# Patient Record
Sex: FEMALE | Race: WHITE | Employment: UNEMPLOYED | URBAN - NONMETROPOLITAN AREA
[De-identification: names, ages, dates, MRNs, and addresses within clinical notes are randomized per-mention and may not be internally consistent; named-entity substitution may affect disease eponyms.]

---

## 2017-06-28 ENCOUNTER — OFFICE VISIT (OUTPATIENT)
Dept: OTOLARYNGOLOGY | Age: 47
End: 2017-06-28

## 2017-06-28 VITALS
HEART RATE: 76 BPM | TEMPERATURE: 98.2 F | HEIGHT: 63 IN | SYSTOLIC BLOOD PRESSURE: 110 MMHG | DIASTOLIC BLOOD PRESSURE: 74 MMHG | BODY MASS INDEX: 22.73 KG/M2 | WEIGHT: 128.3 LBS | RESPIRATION RATE: 16 BRPM

## 2017-06-28 DIAGNOSIS — H65.193 ACUTE MIDDLE EAR EFFUSION, BILATERAL: Primary | ICD-10-CM

## 2017-06-28 PROCEDURE — 1036F TOBACCO NON-USER: CPT | Performed by: NURSE PRACTITIONER

## 2017-06-28 PROCEDURE — G8420 CALC BMI NORM PARAMETERS: HCPCS | Performed by: NURSE PRACTITIONER

## 2017-06-28 PROCEDURE — G8427 DOCREV CUR MEDS BY ELIG CLIN: HCPCS | Performed by: NURSE PRACTITIONER

## 2017-06-28 PROCEDURE — 99213 OFFICE O/P EST LOW 20 MIN: CPT | Performed by: NURSE PRACTITIONER

## 2017-06-28 RX ORDER — METHYLPREDNISOLONE 4 MG/1
TABLET ORAL
Qty: 1 KIT | Refills: 0 | Status: SHIPPED | OUTPATIENT
Start: 2017-06-28 | End: 2017-07-04

## 2017-06-28 ASSESSMENT — ENCOUNTER SYMPTOMS
SINUS PRESSURE: 0
BLOOD IN STOOL: 0
EYE DISCHARGE: 0
APNEA: 0
EYE REDNESS: 0
ABDOMINAL DISTENTION: 0
CONSTIPATION: 0
CHOKING: 0
PHOTOPHOBIA: 0
ABDOMINAL PAIN: 0
WHEEZING: 0
EYE ITCHING: 0
COUGH: 0
COLOR CHANGE: 0
STRIDOR: 0
TROUBLE SWALLOWING: 0
DIARRHEA: 0
VOICE CHANGE: 0
ANAL BLEEDING: 0
CHEST TIGHTNESS: 0
EYE PAIN: 0
FACIAL SWELLING: 0
RHINORRHEA: 0
SHORTNESS OF BREATH: 0
NAUSEA: 0
BACK PAIN: 0
RECTAL PAIN: 0
SORE THROAT: 0
VOMITING: 0

## 2017-07-05 ENCOUNTER — TELEPHONE (OUTPATIENT)
Dept: OTOLARYNGOLOGY | Age: 47
End: 2017-07-05

## 2017-07-14 ENCOUNTER — OFFICE VISIT (OUTPATIENT)
Dept: OTOLARYNGOLOGY | Age: 47
End: 2017-07-14

## 2017-07-14 VITALS
HEIGHT: 63 IN | RESPIRATION RATE: 16 BRPM | SYSTOLIC BLOOD PRESSURE: 110 MMHG | BODY MASS INDEX: 23.27 KG/M2 | DIASTOLIC BLOOD PRESSURE: 60 MMHG | WEIGHT: 131.3 LBS | HEART RATE: 88 BPM | TEMPERATURE: 98.3 F

## 2017-07-14 DIAGNOSIS — H91.90 PERCEIVED HEARING LOSS: Primary | ICD-10-CM

## 2017-07-14 PROCEDURE — 1036F TOBACCO NON-USER: CPT | Performed by: OTOLARYNGOLOGY

## 2017-07-14 PROCEDURE — G8427 DOCREV CUR MEDS BY ELIG CLIN: HCPCS | Performed by: OTOLARYNGOLOGY

## 2017-07-14 PROCEDURE — G8420 CALC BMI NORM PARAMETERS: HCPCS | Performed by: OTOLARYNGOLOGY

## 2017-07-14 PROCEDURE — 99212 OFFICE O/P EST SF 10 MIN: CPT | Performed by: OTOLARYNGOLOGY

## 2017-07-14 ASSESSMENT — ENCOUNTER SYMPTOMS
SORE THROAT: 0
VOICE CHANGE: 0
EYE DISCHARGE: 0
VOMITING: 0
WHEEZING: 0
BLOOD IN STOOL: 0
DIARRHEA: 0
ABDOMINAL DISTENTION: 0
SINUS PRESSURE: 0
TROUBLE SWALLOWING: 0
BACK PAIN: 1
RHINORRHEA: 0
RECTAL PAIN: 0
ABDOMINAL PAIN: 0
NAUSEA: 0
PHOTOPHOBIA: 0
CHOKING: 0
EYE PAIN: 0
COLOR CHANGE: 0
APNEA: 0
ANAL BLEEDING: 0
CHEST TIGHTNESS: 0
SHORTNESS OF BREATH: 0
CONSTIPATION: 0
COUGH: 0
FACIAL SWELLING: 0
EYE ITCHING: 0
STRIDOR: 0
EYE REDNESS: 0

## 2019-03-13 ENCOUNTER — APPOINTMENT (OUTPATIENT)
Dept: CT IMAGING | Age: 49
End: 2019-03-13
Payer: COMMERCIAL

## 2019-03-13 ENCOUNTER — HOSPITAL ENCOUNTER (EMERGENCY)
Age: 49
Discharge: HOME | End: 2019-03-13
Attending: EMERGENCY MEDICINE
Payer: COMMERCIAL

## 2019-03-13 VITALS
DIASTOLIC BLOOD PRESSURE: 82 MMHG | BODY MASS INDEX: 23.04 KG/M2 | HEART RATE: 99 BPM | TEMPERATURE: 98.3 F | RESPIRATION RATE: 18 BRPM | SYSTOLIC BLOOD PRESSURE: 111 MMHG | WEIGHT: 130 LBS | HEIGHT: 63 IN | OXYGEN SATURATION: 98 %

## 2019-03-13 DIAGNOSIS — N39.0 URINARY TRACT INFECTION WITHOUT HEMATURIA, SITE UNSPECIFIED: Primary | ICD-10-CM

## 2019-03-13 DIAGNOSIS — R55 NEAR SYNCOPE: ICD-10-CM

## 2019-03-13 LAB
ALBUMIN SERPL-MCNC: 4.5 G/DL (ref 3.5–5.1)
ALP BLD-CCNC: 18 U/L (ref 38–126)
ALT SERPL-CCNC: 12 U/L (ref 11–66)
AMPHETAMINE+METHAMPHETAMINE URINE SCREEN: NEGATIVE
ANION GAP SERPL CALCULATED.3IONS-SCNC: 14 MEQ/L (ref 8–16)
AST SERPL-CCNC: 15 U/L (ref 5–40)
BACTERIA: ABNORMAL /HPF
BARBITURATE QUANTITATIVE URINE: NEGATIVE
BASOPHILS # BLD: 0.5 %
BASOPHILS ABSOLUTE: 0.1 THOU/MM3 (ref 0–0.1)
BENZODIAZEPINE QUANTITATIVE URINE: NEGATIVE
BILIRUB SERPL-MCNC: 0.5 MG/DL (ref 0.3–1.2)
BILIRUBIN DIRECT: < 0.2 MG/DL (ref 0–0.3)
BILIRUBIN URINE: NEGATIVE
BLOOD, URINE: ABNORMAL
BUN BLDV-MCNC: 14 MG/DL (ref 7–22)
CALCIUM SERPL-MCNC: 9.2 MG/DL (ref 8.5–10.5)
CANNABINOID QUANTITATIVE URINE: NEGATIVE
CASTS 2: ABNORMAL /LPF
CASTS UA: ABNORMAL /LPF
CHARACTER, URINE: CLEAR
CHLORIDE BLD-SCNC: 100 MEQ/L (ref 98–111)
CO2: 23 MEQ/L (ref 23–33)
COCAINE METABOLITE QUANTITATIVE URINE: NEGATIVE
COLOR: YELLOW
CREAT SERPL-MCNC: 0.6 MG/DL (ref 0.4–1.2)
CRYSTALS, UA: ABNORMAL
EKG ATRIAL RATE: 84 BPM
EKG P AXIS: 67 DEGREES
EKG P-R INTERVAL: 136 MS
EKG Q-T INTERVAL: 356 MS
EKG QRS DURATION: 72 MS
EKG QTC CALCULATION (BAZETT): 420 MS
EKG R AXIS: 75 DEGREES
EKG T AXIS: 64 DEGREES
EKG VENTRICULAR RATE: 84 BPM
EOSINOPHIL # BLD: 2.3 %
EOSINOPHILS ABSOLUTE: 0.2 THOU/MM3 (ref 0–0.4)
EPITHELIAL CELLS, UA: ABNORMAL /HPF
ERYTHROCYTE [DISTWIDTH] IN BLOOD BY AUTOMATED COUNT: 12.9 % (ref 11.5–14.5)
ERYTHROCYTE [DISTWIDTH] IN BLOOD BY AUTOMATED COUNT: 44 FL (ref 35–45)
ETHYL ALCOHOL, SERUM: < 0.01 %
FLU A ANTIGEN: NEGATIVE
FLU B ANTIGEN: NEGATIVE
GFR SERPL CREATININE-BSD FRML MDRD: > 90 ML/MIN/1.73M2
GLUCOSE BLD-MCNC: 98 MG/DL (ref 70–108)
GLUCOSE URINE: NEGATIVE MG/DL
HCT VFR BLD CALC: 42.2 % (ref 37–47)
HEMOGLOBIN: 13.7 GM/DL (ref 12–16)
IMMATURE GRANS (ABS): 0.05 THOU/MM3 (ref 0–0.07)
IMMATURE GRANULOCYTES: 0.5 %
INR BLD: 0.89 (ref 0.85–1.13)
KETONES, URINE: NEGATIVE
LEUKOCYTE ESTERASE, URINE: ABNORMAL
LIPASE: 22 U/L (ref 5.6–51.3)
LYMPHOCYTES # BLD: 14.2 %
LYMPHOCYTES ABSOLUTE: 1.5 THOU/MM3 (ref 1–4.8)
MAGNESIUM: 2 MG/DL (ref 1.6–2.4)
MCH RBC QN AUTO: 30.2 PG (ref 26–33)
MCHC RBC AUTO-ENTMCNC: 32.5 GM/DL (ref 32.2–35.5)
MCV RBC AUTO: 93.2 FL (ref 81–99)
MISCELLANEOUS 2: ABNORMAL
MONOCYTES # BLD: 8.1 %
MONOCYTES ABSOLUTE: 0.9 THOU/MM3 (ref 0.4–1.3)
MUCUS: ABNORMAL
NITRITE, URINE: NEGATIVE
NUCLEATED RED BLOOD CELLS: 0 /100 WBC
OPIATES, URINE: NEGATIVE
OSMOLALITY CALCULATION: 274.3 MOSMOL/KG (ref 275–300)
OXYCODONE: NEGATIVE
PH UA: 5.5 (ref 5–9)
PHENCYCLIDINE QUANTITATIVE URINE: NEGATIVE
PLATELET # BLD: 281 THOU/MM3 (ref 130–400)
PMV BLD AUTO: 9.2 FL (ref 9.4–12.4)
POTASSIUM SERPL-SCNC: 3.9 MEQ/L (ref 3.5–5.2)
PRO-BNP: 65.1 PG/ML (ref 0–900)
PROLACTIN: 37 NG/ML
PROTEIN UA: NEGATIVE
RBC # BLD: 4.53 MILL/MM3 (ref 4.2–5.4)
RBC URINE: ABNORMAL /HPF
REASON FOR REJECTION: NORMAL
REJECTED TEST: NORMAL
RENAL EPITHELIAL, UA: ABNORMAL
SEG NEUTROPHILS: 74.4 %
SEGMENTED NEUTROPHILS ABSOLUTE COUNT: 8 THOU/MM3 (ref 1.8–7.7)
SODIUM BLD-SCNC: 137 MEQ/L (ref 135–145)
SPECIFIC GRAVITY, URINE: 1.02 (ref 1–1.03)
TOTAL PROTEIN: 7.2 G/DL (ref 6.1–8)
TROPONIN T: < 0.01 NG/ML
TSH SERPL DL<=0.05 MIU/L-ACNC: 6.27 UIU/ML (ref 0.4–4.2)
UROBILINOGEN, URINE: 0.2 EU/DL (ref 0–1)
WBC # BLD: 10.7 THOU/MM3 (ref 4.8–10.8)
WBC UA: ABNORMAL /HPF
YEAST: ABNORMAL

## 2019-03-13 PROCEDURE — 99285 EMERGENCY DEPT VISIT HI MDM: CPT

## 2019-03-13 PROCEDURE — 81001 URINALYSIS AUTO W/SCOPE: CPT

## 2019-03-13 PROCEDURE — 2580000003 HC RX 258: Performed by: EMERGENCY MEDICINE

## 2019-03-13 PROCEDURE — 85025 COMPLETE CBC W/AUTO DIFF WBC: CPT

## 2019-03-13 PROCEDURE — 93010 ELECTROCARDIOGRAM REPORT: CPT | Performed by: INTERNAL MEDICINE

## 2019-03-13 PROCEDURE — 82248 BILIRUBIN DIRECT: CPT

## 2019-03-13 PROCEDURE — 83690 ASSAY OF LIPASE: CPT

## 2019-03-13 PROCEDURE — 85610 PROTHROMBIN TIME: CPT

## 2019-03-13 PROCEDURE — 84146 ASSAY OF PROLACTIN: CPT

## 2019-03-13 PROCEDURE — G0480 DRUG TEST DEF 1-7 CLASSES: HCPCS

## 2019-03-13 PROCEDURE — 84443 ASSAY THYROID STIM HORMONE: CPT

## 2019-03-13 PROCEDURE — 87086 URINE CULTURE/COLONY COUNT: CPT

## 2019-03-13 PROCEDURE — 83735 ASSAY OF MAGNESIUM: CPT

## 2019-03-13 PROCEDURE — 80307 DRUG TEST PRSMV CHEM ANLYZR: CPT

## 2019-03-13 PROCEDURE — 93005 ELECTROCARDIOGRAM TRACING: CPT | Performed by: EMERGENCY MEDICINE

## 2019-03-13 PROCEDURE — 87186 SC STD MICRODIL/AGAR DIL: CPT

## 2019-03-13 PROCEDURE — 87184 SC STD DISK METHOD PER PLATE: CPT

## 2019-03-13 PROCEDURE — 87804 INFLUENZA ASSAY W/OPTIC: CPT

## 2019-03-13 PROCEDURE — 87077 CULTURE AEROBIC IDENTIFY: CPT

## 2019-03-13 PROCEDURE — 83880 ASSAY OF NATRIURETIC PEPTIDE: CPT

## 2019-03-13 PROCEDURE — 80053 COMPREHEN METABOLIC PANEL: CPT

## 2019-03-13 PROCEDURE — 84484 ASSAY OF TROPONIN QUANT: CPT

## 2019-03-13 PROCEDURE — 36415 COLL VENOUS BLD VENIPUNCTURE: CPT

## 2019-03-13 RX ORDER — 0.9 % SODIUM CHLORIDE 0.9 %
1000 INTRAVENOUS SOLUTION INTRAVENOUS ONCE
Status: COMPLETED | OUTPATIENT
Start: 2019-03-13 | End: 2019-03-13

## 2019-03-13 RX ORDER — NITROFURANTOIN 25; 75 MG/1; MG/1
100 CAPSULE ORAL 2 TIMES DAILY
Qty: 20 CAPSULE | Refills: 0 | Status: SHIPPED | OUTPATIENT
Start: 2019-03-13 | End: 2019-03-23

## 2019-03-13 RX ORDER — ONDANSETRON 2 MG/ML
4 INJECTION INTRAMUSCULAR; INTRAVENOUS ONCE
Status: DISCONTINUED | OUTPATIENT
Start: 2019-03-13 | End: 2019-03-13 | Stop reason: HOSPADM

## 2019-03-13 RX ADMIN — SODIUM CHLORIDE 1000 ML: 9 INJECTION, SOLUTION INTRAVENOUS at 04:37

## 2019-03-13 ASSESSMENT — ENCOUNTER SYMPTOMS
NAUSEA: 0
ABDOMINAL PAIN: 0
SINUS PRESSURE: 0
CONSTIPATION: 0
BACK PAIN: 0
WHEEZING: 0
COUGH: 0
TROUBLE SWALLOWING: 0
EYE ITCHING: 0
CHEST TIGHTNESS: 0
EYE DISCHARGE: 0
SHORTNESS OF BREATH: 1
BLOOD IN STOOL: 0
EYE REDNESS: 0
CHOKING: 0
SORE THROAT: 0
RHINORRHEA: 0
VOMITING: 0
EYE PAIN: 0
PHOTOPHOBIA: 0
ABDOMINAL DISTENTION: 0
VOICE CHANGE: 0
DIARRHEA: 0

## 2019-03-15 LAB
ORGANISM: ABNORMAL
URINE CULTURE REFLEX: ABNORMAL

## 2019-04-02 ENCOUNTER — NURSE TRIAGE (OUTPATIENT)
Dept: ADMINISTRATIVE | Age: 49
End: 2019-04-02

## 2019-07-20 ENCOUNTER — HOSPITAL ENCOUNTER (OUTPATIENT)
Age: 49
Setting detail: SPECIMEN
Discharge: HOME | End: 2019-07-20
Payer: COMMERCIAL

## 2019-07-20 LAB
-: NORMAL
AMORPHOUS: NORMAL
BACTERIA: NORMAL
BILIRUBIN URINE: NEGATIVE
CASTS UA: NORMAL /LPF (ref 0–8)
COLOR: YELLOW
COMMENT UA: ABNORMAL
CRYSTALS, UA: NORMAL /HPF
EPITHELIAL CELLS UA: NORMAL /HPF (ref 0–5)
GLUCOSE URINE: NEGATIVE
KETONES, URINE: NEGATIVE
LEUKOCYTE ESTERASE, URINE: ABNORMAL
MUCUS: NORMAL
NITRITE, URINE: NEGATIVE
OTHER OBSERVATIONS UA: NORMAL
PH UA: 6.5 (ref 5–8)
PROTEIN UA: NEGATIVE
RBC UA: NORMAL /HPF (ref 0–4)
RENAL EPITHELIAL, UA: NORMAL /HPF
SPECIFIC GRAVITY UA: 1 (ref 1–1.03)
TRICHOMONAS: NORMAL
TURBIDITY: CLEAR
URINE HGB: NEGATIVE
UROBILINOGEN, URINE: NORMAL
WBC UA: NORMAL /HPF (ref 0–5)
YEAST: NORMAL

## 2019-07-21 LAB
CULTURE: NORMAL
Lab: NORMAL
SPECIMEN DESCRIPTION: NORMAL

## 2019-10-16 ENCOUNTER — HOSPITAL ENCOUNTER (EMERGENCY)
Age: 49
Discharge: HOME | End: 2019-10-17
Attending: EMERGENCY MEDICINE
Payer: COMMERCIAL

## 2019-10-16 VITALS
TEMPERATURE: 99.7 F | RESPIRATION RATE: 16 BRPM | SYSTOLIC BLOOD PRESSURE: 118 MMHG | DIASTOLIC BLOOD PRESSURE: 76 MMHG | OXYGEN SATURATION: 98 % | HEART RATE: 105 BPM | HEIGHT: 64 IN | WEIGHT: 130 LBS | BODY MASS INDEX: 22.2 KG/M2

## 2019-10-16 DIAGNOSIS — F41.1 ANXIETY STATE: Primary | ICD-10-CM

## 2019-10-16 DIAGNOSIS — F41.0 PANIC ATTACK: ICD-10-CM

## 2019-10-16 PROCEDURE — 36415 COLL VENOUS BLD VENIPUNCTURE: CPT

## 2019-10-16 PROCEDURE — G0480 DRUG TEST DEF 1-7 CLASSES: HCPCS

## 2019-10-16 PROCEDURE — 84439 ASSAY OF FREE THYROXINE: CPT

## 2019-10-16 PROCEDURE — 84484 ASSAY OF TROPONIN QUANT: CPT

## 2019-10-16 PROCEDURE — 99284 EMERGENCY DEPT VISIT MOD MDM: CPT

## 2019-10-16 PROCEDURE — 84443 ASSAY THYROID STIM HORMONE: CPT

## 2019-10-16 PROCEDURE — 80053 COMPREHEN METABOLIC PANEL: CPT

## 2019-10-16 PROCEDURE — 85025 COMPLETE CBC W/AUTO DIFF WBC: CPT

## 2019-10-16 ASSESSMENT — SLEEP AND FATIGUE QUESTIONNAIRES
DIFFICULTY ARISING: NO
RESTFUL SLEEP: NO
AVERAGE NUMBER OF SLEEP HOURS: 0
DIFFICULTY STAYING ASLEEP: YES
DO YOU USE A SLEEP AID: NO
DIFFICULTY FALLING ASLEEP: YES
DO YOU HAVE DIFFICULTY SLEEPING: YES
SLEEP PATTERN: INSOMNIA

## 2019-10-17 LAB
ACETAMINOPHEN LEVEL: < 5 UG/ML (ref 0–20)
ALBUMIN SERPL-MCNC: 4.8 G/DL (ref 3.5–5.1)
ALP BLD-CCNC: 23 U/L (ref 38–126)
ALT SERPL-CCNC: 9 U/L (ref 11–66)
AMPHETAMINE+METHAMPHETAMINE URINE SCREEN: NEGATIVE
ANION GAP SERPL CALCULATED.3IONS-SCNC: 19 MEQ/L (ref 8–16)
AST SERPL-CCNC: 13 U/L (ref 5–40)
BARBITURATE QUANTITATIVE URINE: NEGATIVE
BASOPHILS # BLD: 0.6 %
BASOPHILS ABSOLUTE: 0.1 THOU/MM3 (ref 0–0.1)
BENZODIAZEPINE QUANTITATIVE URINE: NEGATIVE
BILIRUB SERPL-MCNC: 0.4 MG/DL (ref 0.3–1.2)
BUN BLDV-MCNC: 13 MG/DL (ref 7–22)
CALCIUM SERPL-MCNC: 10.1 MG/DL (ref 8.5–10.5)
CANNABINOID QUANTITATIVE URINE: NEGATIVE
CHLORIDE BLD-SCNC: 101 MEQ/L (ref 98–111)
CO2: 18 MEQ/L (ref 23–33)
COCAINE METABOLITE QUANTITATIVE URINE: NEGATIVE
CREAT SERPL-MCNC: 0.6 MG/DL (ref 0.4–1.2)
EOSINOPHIL # BLD: 0.7 %
EOSINOPHILS ABSOLUTE: 0.1 THOU/MM3 (ref 0–0.4)
ERYTHROCYTE [DISTWIDTH] IN BLOOD BY AUTOMATED COUNT: 13.2 % (ref 11.5–14.5)
ERYTHROCYTE [DISTWIDTH] IN BLOOD BY AUTOMATED COUNT: 41.2 FL (ref 35–45)
ETHYL ALCOHOL, SERUM: < 0.01 %
GFR SERPL CREATININE-BSD FRML MDRD: > 90 ML/MIN/1.73M2
GLUCOSE BLD-MCNC: 122 MG/DL (ref 70–108)
HCT VFR BLD CALC: 39.6 % (ref 37–47)
HEMOGLOBIN: 13.4 GM/DL (ref 12–16)
IMMATURE GRANS (ABS): 0.03 THOU/MM3 (ref 0–0.07)
IMMATURE GRANULOCYTES: 0.3 %
LYMPHOCYTES # BLD: 22.9 %
LYMPHOCYTES ABSOLUTE: 2.6 THOU/MM3 (ref 1–4.8)
MCH RBC QN AUTO: 29.6 PG (ref 26–33)
MCHC RBC AUTO-ENTMCNC: 33.8 GM/DL (ref 32.2–35.5)
MCV RBC AUTO: 87.4 FL (ref 81–99)
MONOCYTES # BLD: 8.8 %
MONOCYTES ABSOLUTE: 1 THOU/MM3 (ref 0.4–1.3)
NUCLEATED RED BLOOD CELLS: 0 /100 WBC
OPIATES, URINE: NEGATIVE
OSMOLALITY CALCULATION: 277.1 MOSMOL/KG (ref 275–300)
OXYCODONE: NEGATIVE
PHENCYCLIDINE QUANTITATIVE URINE: NEGATIVE
PLATELET # BLD: 358 THOU/MM3 (ref 130–400)
PMV BLD AUTO: 8.9 FL (ref 9.4–12.4)
POTASSIUM SERPL-SCNC: 3.4 MEQ/L (ref 3.5–5.2)
RBC # BLD: 4.53 MILL/MM3 (ref 4.2–5.4)
SALICYLATE, SERUM: < 0.3 MG/DL (ref 2–10)
SEG NEUTROPHILS: 66.7 %
SEGMENTED NEUTROPHILS ABSOLUTE COUNT: 7.5 THOU/MM3 (ref 1.8–7.7)
SODIUM BLD-SCNC: 138 MEQ/L (ref 135–145)
T4 FREE: 1.67 NG/DL (ref 0.93–1.76)
TOTAL PROTEIN: 7.9 G/DL (ref 6.1–8)
TROPONIN T: < 0.01 NG/ML
TSH SERPL DL<=0.05 MIU/L-ACNC: 4.63 UIU/ML (ref 0.4–4.2)
WBC # BLD: 11.3 THOU/MM3 (ref 4.8–10.8)

## 2019-10-17 PROCEDURE — 6370000000 HC RX 637 (ALT 250 FOR IP): Performed by: EMERGENCY MEDICINE

## 2019-10-17 PROCEDURE — 80307 DRUG TEST PRSMV CHEM ANLYZR: CPT

## 2019-10-17 RX ORDER — HYDROXYZINE HYDROCHLORIDE 25 MG/1
50 TABLET, FILM COATED ORAL 3 TIMES DAILY PRN
Qty: 30 TABLET | Refills: 0 | Status: ON HOLD | OUTPATIENT
Start: 2019-10-17 | End: 2019-10-28 | Stop reason: HOSPADM

## 2019-10-17 RX ORDER — LORAZEPAM 1 MG/1
1 TABLET ORAL ONCE
Status: COMPLETED | OUTPATIENT
Start: 2019-10-17 | End: 2019-10-17

## 2019-10-17 RX ADMIN — LORAZEPAM 1 MG: 1 TABLET ORAL at 00:35

## 2019-10-17 ASSESSMENT — ENCOUNTER SYMPTOMS
ABDOMINAL PAIN: 0
ABDOMINAL DISTENTION: 0
COUGH: 0
RHINORRHEA: 0
DIARRHEA: 0
NAUSEA: 0
EYE ITCHING: 0
EYE DISCHARGE: 0
SHORTNESS OF BREATH: 0
VOMITING: 0
WHEEZING: 0

## 2019-10-22 ENCOUNTER — HOSPITAL ENCOUNTER (INPATIENT)
Age: 49
LOS: 6 days | Discharge: HOME | DRG: 885 | End: 2019-10-28
Attending: PSYCHIATRY & NEUROLOGY | Admitting: PSYCHIATRY & NEUROLOGY
Payer: COMMERCIAL

## 2019-10-22 DIAGNOSIS — E87.6 HYPOKALEMIA: ICD-10-CM

## 2019-10-22 DIAGNOSIS — F31.9 BIPOLAR 1 DISORDER (HCC): Primary | ICD-10-CM

## 2019-10-22 PROBLEM — F20.9 SCHIZOPHRENIA (HCC): Status: ACTIVE | Noted: 2019-10-22

## 2019-10-22 LAB
ACETAMINOPHEN LEVEL: < 5 UG/ML (ref 0–20)
ALBUMIN SERPL-MCNC: 4.2 G/DL (ref 3.5–5.1)
ALP BLD-CCNC: 18 U/L (ref 38–126)
ALT SERPL-CCNC: 13 U/L (ref 11–66)
ANION GAP SERPL CALCULATED.3IONS-SCNC: 13 MEQ/L (ref 8–16)
AST SERPL-CCNC: 14 U/L (ref 5–40)
BASOPHILS # BLD: 0.7 %
BASOPHILS ABSOLUTE: 0 THOU/MM3 (ref 0–0.1)
BILIRUB SERPL-MCNC: 0.4 MG/DL (ref 0.3–1.2)
BUN BLDV-MCNC: 7 MG/DL (ref 7–22)
CALCIUM SERPL-MCNC: 9.2 MG/DL (ref 8.5–10.5)
CHLORIDE BLD-SCNC: 103 MEQ/L (ref 98–111)
CO2: 27 MEQ/L (ref 23–33)
CREAT SERPL-MCNC: 0.5 MG/DL (ref 0.4–1.2)
EOSINOPHIL # BLD: 1.7 %
EOSINOPHILS ABSOLUTE: 0.1 THOU/MM3 (ref 0–0.4)
ERYTHROCYTE [DISTWIDTH] IN BLOOD BY AUTOMATED COUNT: 13.3 % (ref 11.5–14.5)
ERYTHROCYTE [DISTWIDTH] IN BLOOD BY AUTOMATED COUNT: 44.6 FL (ref 35–45)
ETHYL ALCOHOL, SERUM: < 0.01 %
GFR SERPL CREATININE-BSD FRML MDRD: > 90 ML/MIN/1.73M2
GLUCOSE BLD-MCNC: 90 MG/DL (ref 70–108)
HCT VFR BLD CALC: 36.4 % (ref 37–47)
HEMOGLOBIN: 11.7 GM/DL (ref 12–16)
IMMATURE GRANS (ABS): 0.01 THOU/MM3 (ref 0–0.07)
IMMATURE GRANULOCYTES: 0.2 %
LYMPHOCYTES # BLD: 25.2 %
LYMPHOCYTES ABSOLUTE: 1.4 THOU/MM3 (ref 1–4.8)
MCH RBC QN AUTO: 29.3 PG (ref 26–33)
MCHC RBC AUTO-ENTMCNC: 32.1 GM/DL (ref 32.2–35.5)
MCV RBC AUTO: 91 FL (ref 81–99)
MONOCYTES # BLD: 10.1 %
MONOCYTES ABSOLUTE: 0.6 THOU/MM3 (ref 0.4–1.3)
NUCLEATED RED BLOOD CELLS: 0 /100 WBC
OSMOLALITY CALCULATION: 282.5 MOSMOL/KG (ref 275–300)
PLATELET # BLD: 304 THOU/MM3 (ref 130–400)
PMV BLD AUTO: 8.7 FL (ref 9.4–12.4)
POTASSIUM SERPL-SCNC: 2.8 MEQ/L (ref 3.5–5.2)
PREGNANCY, SERUM: NEGATIVE
RBC # BLD: 4 MILL/MM3 (ref 4.2–5.4)
SALICYLATE, SERUM: < 0.3 MG/DL (ref 2–10)
SEG NEUTROPHILS: 62.1 %
SEGMENTED NEUTROPHILS ABSOLUTE COUNT: 3.5 THOU/MM3 (ref 1.8–7.7)
SODIUM BLD-SCNC: 143 MEQ/L (ref 135–145)
TOTAL PROTEIN: 6.4 G/DL (ref 6.1–8)
TSH SERPL DL<=0.05 MIU/L-ACNC: 2.4 UIU/ML (ref 0.4–4.2)
WBC # BLD: 5.7 THOU/MM3 (ref 4.8–10.8)

## 2019-10-22 PROCEDURE — 6370000000 HC RX 637 (ALT 250 FOR IP): Performed by: PHYSICIAN ASSISTANT

## 2019-10-22 PROCEDURE — 96372 THER/PROPH/DIAG INJ SC/IM: CPT

## 2019-10-22 PROCEDURE — 2580000003 HC RX 258: Performed by: PHYSICIAN ASSISTANT

## 2019-10-22 PROCEDURE — 99285 EMERGENCY DEPT VISIT HI MDM: CPT

## 2019-10-22 PROCEDURE — 85025 COMPLETE CBC W/AUTO DIFF WBC: CPT

## 2019-10-22 PROCEDURE — 80053 COMPREHEN METABOLIC PANEL: CPT

## 2019-10-22 PROCEDURE — 6360000002 HC RX W HCPCS: Performed by: PHYSICIAN ASSISTANT

## 2019-10-22 PROCEDURE — G0480 DRUG TEST DEF 1-7 CLASSES: HCPCS

## 2019-10-22 PROCEDURE — 84443 ASSAY THYROID STIM HORMONE: CPT

## 2019-10-22 PROCEDURE — 96374 THER/PROPH/DIAG INJ IV PUSH: CPT

## 2019-10-22 PROCEDURE — 36415 COLL VENOUS BLD VENIPUNCTURE: CPT

## 2019-10-22 PROCEDURE — 84703 CHORIONIC GONADOTROPIN ASSAY: CPT

## 2019-10-22 PROCEDURE — 1240000000 HC EMOTIONAL WELLNESS R&B

## 2019-10-22 RX ORDER — LORAZEPAM 2 MG/ML
2 INJECTION INTRAMUSCULAR ONCE
Status: COMPLETED | OUTPATIENT
Start: 2019-10-22 | End: 2019-10-22

## 2019-10-22 RX ORDER — BENZTROPINE MESYLATE 0.5 MG/1
0.5 TABLET ORAL 2 TIMES DAILY PRN
Status: ON HOLD | COMMUNITY
End: 2019-10-28 | Stop reason: HOSPADM

## 2019-10-22 RX ORDER — ZIPRASIDONE MESYLATE 20 MG/ML
20 INJECTION, POWDER, LYOPHILIZED, FOR SOLUTION INTRAMUSCULAR ONCE
Status: COMPLETED | OUTPATIENT
Start: 2019-10-22 | End: 2019-10-22

## 2019-10-22 RX ORDER — HYDROXYZINE HYDROCHLORIDE 25 MG/1
50 TABLET, FILM COATED ORAL 3 TIMES DAILY PRN
Status: DISCONTINUED | OUTPATIENT
Start: 2019-10-22 | End: 2019-10-28 | Stop reason: HOSPADM

## 2019-10-22 RX ORDER — ACETAMINOPHEN 325 MG/1
650 TABLET ORAL EVERY 4 HOURS PRN
Status: DISCONTINUED | OUTPATIENT
Start: 2019-10-22 | End: 2019-10-28 | Stop reason: HOSPADM

## 2019-10-22 RX ORDER — MAGNESIUM HYDROXIDE/ALUMINUM HYDROXICE/SIMETHICONE 120; 1200; 1200 MG/30ML; MG/30ML; MG/30ML
30 SUSPENSION ORAL EVERY 6 HOURS PRN
Status: DISCONTINUED | OUTPATIENT
Start: 2019-10-22 | End: 2019-10-28 | Stop reason: HOSPADM

## 2019-10-22 RX ORDER — ZIPRASIDONE MESYLATE 20 MG/ML
10 INJECTION, POWDER, LYOPHILIZED, FOR SOLUTION INTRAMUSCULAR 3 TIMES DAILY PRN
Status: DISCONTINUED | OUTPATIENT
Start: 2019-10-22 | End: 2019-10-28 | Stop reason: HOSPADM

## 2019-10-22 RX ORDER — POTASSIUM CHLORIDE 750 MG/1
40 TABLET, FILM COATED, EXTENDED RELEASE ORAL ONCE
Status: COMPLETED | OUTPATIENT
Start: 2019-10-22 | End: 2019-10-22

## 2019-10-22 RX ORDER — ARIPIPRAZOLE 10 MG/1
10 TABLET ORAL DAILY
Status: ON HOLD | COMMUNITY
End: 2019-10-28 | Stop reason: HOSPADM

## 2019-10-22 RX ORDER — DIPHENHYDRAMINE HYDROCHLORIDE 50 MG/ML
25 INJECTION INTRAMUSCULAR; INTRAVENOUS ONCE
Status: COMPLETED | OUTPATIENT
Start: 2019-10-22 | End: 2019-10-22

## 2019-10-22 RX ORDER — HALOPERIDOL 5 MG
2.5 TABLET ORAL 2 TIMES DAILY PRN
Status: ON HOLD | COMMUNITY
End: 2019-10-28 | Stop reason: HOSPADM

## 2019-10-22 RX ORDER — IBUPROFEN 400 MG/1
400 TABLET ORAL EVERY 6 HOURS PRN
Status: DISCONTINUED | OUTPATIENT
Start: 2019-10-22 | End: 2019-10-28 | Stop reason: HOSPADM

## 2019-10-22 RX ORDER — POTASSIUM CHLORIDE 750 MG/1
40 TABLET, FILM COATED, EXTENDED RELEASE ORAL
Status: DISCONTINUED | OUTPATIENT
Start: 2019-10-23 | End: 2019-10-28 | Stop reason: HOSPADM

## 2019-10-22 RX ADMIN — LORAZEPAM 2 MG: 2 INJECTION INTRAMUSCULAR; INTRAVENOUS at 09:59

## 2019-10-22 RX ADMIN — DIPHENHYDRAMINE HYDROCHLORIDE 25 MG: 50 INJECTION, SOLUTION INTRAMUSCULAR; INTRAVENOUS at 11:41

## 2019-10-22 RX ADMIN — ZIPRASIDONE MESYLATE 20 MG: 20 INJECTION, POWDER, LYOPHILIZED, FOR SOLUTION INTRAMUSCULAR at 11:40

## 2019-10-22 RX ADMIN — POTASSIUM CHLORIDE 40 MEQ: 750 TABLET, FILM COATED, EXTENDED RELEASE ORAL at 14:29

## 2019-10-22 RX ADMIN — WATER 1.2 ML: 1 INJECTION INTRAMUSCULAR; INTRAVENOUS; SUBCUTANEOUS at 11:41

## 2019-10-22 ASSESSMENT — LIFESTYLE VARIABLES: HISTORY_ALCOHOL_USE: NO

## 2019-10-22 ASSESSMENT — PAIN SCALES - GENERAL
PAINLEVEL_OUTOF10: 0
PAINLEVEL_OUTOF10: 0

## 2019-10-22 ASSESSMENT — SLEEP AND FATIGUE QUESTIONNAIRES
DO YOU USE A SLEEP AID: NO
AVERAGE NUMBER OF SLEEP HOURS: 2
DO YOU HAVE DIFFICULTY SLEEPING: YES
DIFFICULTY FALLING ASLEEP: YES
DIFFICULTY STAYING ASLEEP: YES
DIFFICULTY ARISING: YES
SLEEP PATTERN: DIFFICULTY FALLING ASLEEP;DISTURBED/INTERRUPTED SLEEP;EARLY AWAKENING;INSOMNIA;RESTLESSNESS
RESTFUL SLEEP: NO

## 2019-10-23 PROCEDURE — 90792 PSYCH DIAG EVAL W/MED SRVCS: CPT | Performed by: PSYCHIATRY & NEUROLOGY

## 2019-10-23 PROCEDURE — 6370000000 HC RX 637 (ALT 250 FOR IP): Performed by: PHYSICIAN ASSISTANT

## 2019-10-23 PROCEDURE — 6370000000 HC RX 637 (ALT 250 FOR IP): Performed by: PSYCHIATRY & NEUROLOGY

## 2019-10-23 PROCEDURE — 1240000000 HC EMOTIONAL WELLNESS R&B

## 2019-10-23 PROCEDURE — APPSS60 APP SPLIT SHARED TIME 46-60 MINUTES: Performed by: PHYSICIAN ASSISTANT

## 2019-10-23 RX ORDER — QUETIAPINE FUMARATE 25 MG/1
50 TABLET, FILM COATED ORAL 2 TIMES DAILY
Status: DISCONTINUED | OUTPATIENT
Start: 2019-10-23 | End: 2019-10-25

## 2019-10-23 RX ORDER — GLATIRAMER 40 MG/ML
40 INJECTION, SOLUTION SUBCUTANEOUS
Status: DISCONTINUED | OUTPATIENT
Start: 2019-10-23 | End: 2019-10-28 | Stop reason: HOSPADM

## 2019-10-23 RX ADMIN — QUETIAPINE FUMARATE 50 MG: 25 TABLET ORAL at 11:58

## 2019-10-23 RX ADMIN — GLATIRAMER 40 MG: 40 INJECTION, SOLUTION SUBCUTANEOUS at 21:32

## 2019-10-23 RX ADMIN — QUETIAPINE FUMARATE 50 MG: 25 TABLET ORAL at 21:30

## 2019-10-23 RX ADMIN — POTASSIUM CHLORIDE 40 MEQ: 750 TABLET, EXTENDED RELEASE ORAL at 11:58

## 2019-10-23 ASSESSMENT — PAIN DESCRIPTION - ORIENTATION: ORIENTATION: OTHER (COMMENT)

## 2019-10-23 ASSESSMENT — PAIN DESCRIPTION - PROGRESSION: CLINICAL_PROGRESSION: NOT CHANGED

## 2019-10-23 ASSESSMENT — PAIN DESCRIPTION - DESCRIPTORS: DESCRIPTORS: ACHING;DISCOMFORT;TENDER

## 2019-10-23 ASSESSMENT — PAIN SCALES - GENERAL
PAINLEVEL_OUTOF10: 3
PAINLEVEL_OUTOF10: 10

## 2019-10-23 ASSESSMENT — PAIN DESCRIPTION - ONSET: ONSET: ON-GOING

## 2019-10-23 ASSESSMENT — SLEEP AND FATIGUE QUESTIONNAIRES
RESTFUL SLEEP: NO
DO YOU USE A SLEEP AID: NO
DIFFICULTY FALLING ASLEEP: YES
DIFFICULTY STAYING ASLEEP: YES
DO YOU HAVE DIFFICULTY SLEEPING: YES
DIFFICULTY ARISING: YES
SLEEP PATTERN: DIFFICULTY FALLING ASLEEP;DIFFICULTY ARISING;DISTURBED/INTERRUPTED SLEEP;RESTLESSNESS;INSOMNIA

## 2019-10-23 ASSESSMENT — PAIN - FUNCTIONAL ASSESSMENT: PAIN_FUNCTIONAL_ASSESSMENT: PREVENTS OR INTERFERES SOME ACTIVE ACTIVITIES AND ADLS

## 2019-10-23 ASSESSMENT — PAIN DESCRIPTION - LOCATION: LOCATION: GENERALIZED

## 2019-10-23 ASSESSMENT — PAIN DESCRIPTION - FREQUENCY: FREQUENCY: CONTINUOUS

## 2019-10-24 LAB
ANION GAP SERPL CALCULATED.3IONS-SCNC: 14 MEQ/L (ref 8–16)
BUN BLDV-MCNC: 8 MG/DL (ref 7–22)
CALCIUM SERPL-MCNC: 9.6 MG/DL (ref 8.5–10.5)
CHLORIDE BLD-SCNC: 98 MEQ/L (ref 98–111)
CO2: 27 MEQ/L (ref 23–33)
CREAT SERPL-MCNC: 0.6 MG/DL (ref 0.4–1.2)
GFR SERPL CREATININE-BSD FRML MDRD: > 90 ML/MIN/1.73M2
GLUCOSE BLD-MCNC: 116 MG/DL (ref 70–108)
POTASSIUM SERPL-SCNC: 4 MEQ/L (ref 3.5–5.2)
SODIUM BLD-SCNC: 139 MEQ/L (ref 135–145)

## 2019-10-24 PROCEDURE — APPSS30 APP SPLIT SHARED TIME 16-30 MINUTES: Performed by: PHYSICIAN ASSISTANT

## 2019-10-24 PROCEDURE — 99232 SBSQ HOSP IP/OBS MODERATE 35: CPT | Performed by: PSYCHIATRY & NEUROLOGY

## 2019-10-24 PROCEDURE — 36415 COLL VENOUS BLD VENIPUNCTURE: CPT

## 2019-10-24 PROCEDURE — 1240000000 HC EMOTIONAL WELLNESS R&B

## 2019-10-24 PROCEDURE — 6370000000 HC RX 637 (ALT 250 FOR IP): Performed by: PHYSICIAN ASSISTANT

## 2019-10-24 PROCEDURE — 6370000000 HC RX 637 (ALT 250 FOR IP): Performed by: PSYCHIATRY & NEUROLOGY

## 2019-10-24 PROCEDURE — 80048 BASIC METABOLIC PNL TOTAL CA: CPT

## 2019-10-24 PROCEDURE — 90833 PSYTX W PT W E/M 30 MIN: CPT | Performed by: PSYCHIATRY & NEUROLOGY

## 2019-10-24 RX ADMIN — QUETIAPINE FUMARATE 50 MG: 25 TABLET ORAL at 20:02

## 2019-10-24 RX ADMIN — POTASSIUM CHLORIDE 40 MEQ: 750 TABLET, EXTENDED RELEASE ORAL at 08:16

## 2019-10-24 RX ADMIN — QUETIAPINE FUMARATE 50 MG: 25 TABLET ORAL at 08:16

## 2019-10-24 ASSESSMENT — PAIN SCALES - GENERAL
PAINLEVEL_OUTOF10: 6
PAINLEVEL_OUTOF10: 0

## 2019-10-24 ASSESSMENT — PAIN DESCRIPTION - FREQUENCY: FREQUENCY: CONTINUOUS

## 2019-10-24 ASSESSMENT — PAIN DESCRIPTION - PROGRESSION: CLINICAL_PROGRESSION: NOT CHANGED

## 2019-10-24 ASSESSMENT — PAIN DESCRIPTION - DESCRIPTORS: DESCRIPTORS: ACHING;DISCOMFORT

## 2019-10-24 ASSESSMENT — PAIN DESCRIPTION - ONSET: ONSET: ON-GOING

## 2019-10-24 ASSESSMENT — PAIN DESCRIPTION - LOCATION: LOCATION: GENERALIZED

## 2019-10-24 ASSESSMENT — PAIN DESCRIPTION - ORIENTATION: ORIENTATION: OTHER (COMMENT)

## 2019-10-24 ASSESSMENT — PAIN - FUNCTIONAL ASSESSMENT: PAIN_FUNCTIONAL_ASSESSMENT: PREVENTS OR INTERFERES SOME ACTIVE ACTIVITIES AND ADLS

## 2019-10-25 LAB — POTASSIUM SERPL-SCNC: 4.7 MEQ/L (ref 3.5–5.2)

## 2019-10-25 PROCEDURE — 99232 SBSQ HOSP IP/OBS MODERATE 35: CPT | Performed by: PSYCHIATRY & NEUROLOGY

## 2019-10-25 PROCEDURE — 6370000000 HC RX 637 (ALT 250 FOR IP): Performed by: PSYCHIATRY & NEUROLOGY

## 2019-10-25 PROCEDURE — 1240000000 HC EMOTIONAL WELLNESS R&B

## 2019-10-25 PROCEDURE — 36415 COLL VENOUS BLD VENIPUNCTURE: CPT

## 2019-10-25 PROCEDURE — 84132 ASSAY OF SERUM POTASSIUM: CPT

## 2019-10-25 PROCEDURE — APPSS30 APP SPLIT SHARED TIME 16-30 MINUTES: Performed by: PHYSICIAN ASSISTANT

## 2019-10-25 PROCEDURE — 90833 PSYTX W PT W E/M 30 MIN: CPT | Performed by: PSYCHIATRY & NEUROLOGY

## 2019-10-25 RX ORDER — QUETIAPINE FUMARATE 25 MG/1
75 TABLET, FILM COATED ORAL NIGHTLY
Status: DISCONTINUED | OUTPATIENT
Start: 2019-10-25 | End: 2019-10-26

## 2019-10-25 RX ORDER — QUETIAPINE FUMARATE 25 MG/1
50 TABLET, FILM COATED ORAL DAILY
Status: DISCONTINUED | OUTPATIENT
Start: 2019-10-26 | End: 2019-10-28

## 2019-10-25 RX ADMIN — GLATIRAMER 40 MG: 40 INJECTION, SOLUTION SUBCUTANEOUS at 20:32

## 2019-10-25 RX ADMIN — QUETIAPINE FUMARATE 50 MG: 25 TABLET ORAL at 08:18

## 2019-10-25 RX ADMIN — QUETIAPINE FUMARATE 75 MG: 25 TABLET ORAL at 20:32

## 2019-10-25 ASSESSMENT — PAIN SCALES - GENERAL: PAINLEVEL_OUTOF10: 0

## 2019-10-26 LAB — POTASSIUM SERPL-SCNC: 4.6 MEQ/L (ref 3.5–5.2)

## 2019-10-26 PROCEDURE — 90833 PSYTX W PT W E/M 30 MIN: CPT | Performed by: PSYCHIATRY & NEUROLOGY

## 2019-10-26 PROCEDURE — 36415 COLL VENOUS BLD VENIPUNCTURE: CPT

## 2019-10-26 PROCEDURE — 1240000000 HC EMOTIONAL WELLNESS R&B

## 2019-10-26 PROCEDURE — 99232 SBSQ HOSP IP/OBS MODERATE 35: CPT | Performed by: PSYCHIATRY & NEUROLOGY

## 2019-10-26 PROCEDURE — 6370000000 HC RX 637 (ALT 250 FOR IP): Performed by: PSYCHIATRY & NEUROLOGY

## 2019-10-26 PROCEDURE — 84132 ASSAY OF SERUM POTASSIUM: CPT

## 2019-10-26 RX ORDER — QUETIAPINE FUMARATE 25 MG/1
50 TABLET, FILM COATED ORAL NIGHTLY
Status: DISCONTINUED | OUTPATIENT
Start: 2019-10-26 | End: 2019-10-28

## 2019-10-26 RX ADMIN — QUETIAPINE FUMARATE 50 MG: 25 TABLET ORAL at 20:23

## 2019-10-26 RX ADMIN — QUETIAPINE FUMARATE 50 MG: 25 TABLET ORAL at 07:58

## 2019-10-26 ASSESSMENT — PAIN - FUNCTIONAL ASSESSMENT: PAIN_FUNCTIONAL_ASSESSMENT: ACTIVITIES ARE NOT PREVENTED

## 2019-10-26 ASSESSMENT — PAIN DESCRIPTION - FREQUENCY: FREQUENCY: CONTINUOUS

## 2019-10-26 ASSESSMENT — PAIN DESCRIPTION - PROGRESSION: CLINICAL_PROGRESSION: NOT CHANGED

## 2019-10-26 ASSESSMENT — PAIN SCALES - GENERAL: PAINLEVEL_OUTOF10: 0

## 2019-10-27 VITALS
HEIGHT: 63 IN | HEART RATE: 102 BPM | BODY MASS INDEX: 21.26 KG/M2 | DIASTOLIC BLOOD PRESSURE: 63 MMHG | WEIGHT: 120 LBS | SYSTOLIC BLOOD PRESSURE: 116 MMHG | OXYGEN SATURATION: 97 % | TEMPERATURE: 98.8 F | RESPIRATION RATE: 16 BRPM

## 2019-10-27 PROCEDURE — 90833 PSYTX W PT W E/M 30 MIN: CPT | Performed by: PSYCHIATRY & NEUROLOGY

## 2019-10-27 PROCEDURE — 1240000000 HC EMOTIONAL WELLNESS R&B

## 2019-10-27 PROCEDURE — 99232 SBSQ HOSP IP/OBS MODERATE 35: CPT | Performed by: PSYCHIATRY & NEUROLOGY

## 2019-10-27 PROCEDURE — 6370000000 HC RX 637 (ALT 250 FOR IP): Performed by: PSYCHIATRY & NEUROLOGY

## 2019-10-27 RX ADMIN — QUETIAPINE FUMARATE 50 MG: 25 TABLET ORAL at 07:23

## 2019-10-27 ASSESSMENT — PAIN DESCRIPTION - PAIN TYPE: TYPE: CHRONIC PAIN

## 2019-10-27 ASSESSMENT — PAIN DESCRIPTION - DESCRIPTORS: DESCRIPTORS: ACHING;DISCOMFORT;NUMBNESS;TINGLING

## 2019-10-27 ASSESSMENT — PAIN DESCRIPTION - LOCATION: LOCATION: LEG;FOOT

## 2019-10-27 ASSESSMENT — PAIN DESCRIPTION - PROGRESSION: CLINICAL_PROGRESSION: NOT CHANGED

## 2019-10-27 ASSESSMENT — PAIN - FUNCTIONAL ASSESSMENT: PAIN_FUNCTIONAL_ASSESSMENT: ACTIVITIES ARE NOT PREVENTED

## 2019-10-27 ASSESSMENT — PAIN SCALES - GENERAL: PAINLEVEL_OUTOF10: 6

## 2019-10-27 ASSESSMENT — PAIN DESCRIPTION - ONSET: ONSET: ON-GOING

## 2019-10-27 ASSESSMENT — PAIN DESCRIPTION - ORIENTATION: ORIENTATION: LEFT;RIGHT

## 2019-10-27 ASSESSMENT — PAIN DESCRIPTION - FREQUENCY: FREQUENCY: CONTINUOUS

## 2019-10-28 PROCEDURE — 99238 HOSP IP/OBS DSCHRG MGMT 30/<: CPT | Performed by: PSYCHIATRY & NEUROLOGY

## 2019-10-28 PROCEDURE — 6370000000 HC RX 637 (ALT 250 FOR IP): Performed by: PSYCHIATRY & NEUROLOGY

## 2019-10-28 RX ORDER — QUETIAPINE FUMARATE 50 MG/1
50 TABLET, EXTENDED RELEASE ORAL NIGHTLY
Qty: 30 TABLET | Refills: 0 | Status: ON HOLD | OUTPATIENT
Start: 2019-10-28 | End: 2021-10-12

## 2019-10-28 RX ORDER — QUETIAPINE FUMARATE 50 MG/1
50 TABLET, EXTENDED RELEASE ORAL NIGHTLY
Status: DISCONTINUED | OUTPATIENT
Start: 2019-10-28 | End: 2019-10-28 | Stop reason: HOSPADM

## 2019-10-28 RX ADMIN — QUETIAPINE FUMARATE 50 MG: 25 TABLET ORAL at 08:26

## 2021-10-12 ENCOUNTER — HOSPITAL ENCOUNTER (INPATIENT)
Age: 51
LOS: 8 days | Discharge: HOME | DRG: 885 | End: 2021-10-20
Attending: PSYCHIATRY & NEUROLOGY | Admitting: PSYCHIATRY & NEUROLOGY
Payer: COMMERCIAL

## 2021-10-12 DIAGNOSIS — F31.9 BIPOLAR 1 DISORDER (HCC): Primary | ICD-10-CM

## 2021-10-12 LAB
ACETAMINOPHEN LEVEL: < 5 UG/ML (ref 0–20)
ALBUMIN SERPL-MCNC: 5.1 G/DL (ref 3.5–5.1)
ALP BLD-CCNC: 23 U/L (ref 38–126)
ALT SERPL-CCNC: 9 U/L (ref 11–66)
AMPHETAMINE+METHAMPHETAMINE URINE SCREEN: NEGATIVE
ANION GAP SERPL CALCULATED.3IONS-SCNC: 20 MEQ/L (ref 8–16)
AST SERPL-CCNC: 12 U/L (ref 5–40)
BARBITURATE QUANTITATIVE URINE: NEGATIVE
BASOPHILS # BLD: 0.6 %
BASOPHILS ABSOLUTE: 0 THOU/MM3 (ref 0–0.1)
BENZODIAZEPINE QUANTITATIVE URINE: NEGATIVE
BILIRUB SERPL-MCNC: 0.4 MG/DL (ref 0.3–1.2)
BUN BLDV-MCNC: 12 MG/DL (ref 7–22)
CALCIUM SERPL-MCNC: 10.1 MG/DL (ref 8.5–10.5)
CANNABINOID QUANTITATIVE URINE: NEGATIVE
CHLORIDE BLD-SCNC: 105 MEQ/L (ref 98–111)
CO2: 22 MEQ/L (ref 23–33)
COCAINE METABOLITE QUANTITATIVE URINE: NEGATIVE
CREAT SERPL-MCNC: 0.6 MG/DL (ref 0.4–1.2)
EOSINOPHIL # BLD: 1.3 %
EOSINOPHILS ABSOLUTE: 0.1 THOU/MM3 (ref 0–0.4)
ERYTHROCYTE [DISTWIDTH] IN BLOOD BY AUTOMATED COUNT: 12.6 % (ref 11.5–14.5)
ERYTHROCYTE [DISTWIDTH] IN BLOOD BY AUTOMATED COUNT: 41.6 FL (ref 35–45)
ETHYL ALCOHOL, SERUM: < 0.01 %
GFR SERPL CREATININE-BSD FRML MDRD: > 90 ML/MIN/1.73M2
GLUCOSE BLD-MCNC: 115 MG/DL (ref 70–108)
HCT VFR BLD CALC: 42.1 % (ref 37–47)
HEMOGLOBIN: 13.8 GM/DL (ref 12–16)
IMMATURE GRANS (ABS): 0.01 THOU/MM3 (ref 0–0.07)
IMMATURE GRANULOCYTES: 0.1 %
LYMPHOCYTES # BLD: 34.3 %
LYMPHOCYTES ABSOLUTE: 2.3 THOU/MM3 (ref 1–4.8)
MCH RBC QN AUTO: 29.9 PG (ref 26–33)
MCHC RBC AUTO-ENTMCNC: 32.8 GM/DL (ref 32.2–35.5)
MCV RBC AUTO: 91.1 FL (ref 81–99)
MONOCYTES # BLD: 8.9 %
MONOCYTES ABSOLUTE: 0.6 THOU/MM3 (ref 0.4–1.3)
NUCLEATED RED BLOOD CELLS: 0 /100 WBC
OPIATES, URINE: NEGATIVE
OXYCODONE: NEGATIVE
PHENCYCLIDINE QUANTITATIVE URINE: NEGATIVE
PLATELET # BLD: 354 THOU/MM3 (ref 130–400)
PMV BLD AUTO: 9 FL (ref 9.4–12.4)
POTASSIUM REFLEX MAGNESIUM: 3.6 MEQ/L (ref 3.5–5.2)
RBC # BLD: 4.62 MILL/MM3 (ref 4.2–5.4)
SALICYLATE, SERUM: < 0.3 MG/DL (ref 2–10)
SEG NEUTROPHILS: 54.8 %
SEGMENTED NEUTROPHILS ABSOLUTE COUNT: 3.7 THOU/MM3 (ref 1.8–7.7)
SODIUM BLD-SCNC: 147 MEQ/L (ref 135–145)
TOTAL PROTEIN: 7.5 G/DL (ref 6.1–8)
WBC # BLD: 6.7 THOU/MM3 (ref 4.8–10.8)

## 2021-10-12 PROCEDURE — 80143 DRUG ASSAY ACETAMINOPHEN: CPT

## 2021-10-12 PROCEDURE — 36415 COLL VENOUS BLD VENIPUNCTURE: CPT

## 2021-10-12 PROCEDURE — 85025 COMPLETE CBC W/AUTO DIFF WBC: CPT

## 2021-10-12 PROCEDURE — 1240000000 HC EMOTIONAL WELLNESS R&B

## 2021-10-12 PROCEDURE — 80307 DRUG TEST PRSMV CHEM ANLYZR: CPT

## 2021-10-12 PROCEDURE — 99283 EMERGENCY DEPT VISIT LOW MDM: CPT

## 2021-10-12 PROCEDURE — 80053 COMPREHEN METABOLIC PANEL: CPT

## 2021-10-12 PROCEDURE — 80179 DRUG ASSAY SALICYLATE: CPT

## 2021-10-12 PROCEDURE — 82077 ASSAY SPEC XCP UR&BREATH IA: CPT

## 2021-10-12 RX ORDER — GLATIRAMER 40 MG/ML
40 INJECTION, SOLUTION SUBCUTANEOUS
Status: DISCONTINUED | OUTPATIENT
Start: 2021-10-13 | End: 2021-10-20 | Stop reason: HOSPADM

## 2021-10-12 RX ORDER — MAGNESIUM HYDROXIDE/ALUMINUM HYDROXICE/SIMETHICONE 120; 1200; 1200 MG/30ML; MG/30ML; MG/30ML
30 SUSPENSION ORAL EVERY 6 HOURS PRN
Status: DISCONTINUED | OUTPATIENT
Start: 2021-10-12 | End: 2021-10-20 | Stop reason: HOSPADM

## 2021-10-12 RX ORDER — HYDROXYZINE HYDROCHLORIDE 25 MG/1
50 TABLET, FILM COATED ORAL 3 TIMES DAILY PRN
Status: DISCONTINUED | OUTPATIENT
Start: 2021-10-12 | End: 2021-10-20 | Stop reason: HOSPADM

## 2021-10-12 RX ORDER — IBUPROFEN 200 MG
400 TABLET ORAL EVERY 6 HOURS PRN
Status: DISCONTINUED | OUTPATIENT
Start: 2021-10-12 | End: 2021-10-20 | Stop reason: HOSPADM

## 2021-10-12 RX ORDER — ACETAMINOPHEN 325 MG/1
650 TABLET ORAL EVERY 4 HOURS PRN
Status: DISCONTINUED | OUTPATIENT
Start: 2021-10-12 | End: 2021-10-20 | Stop reason: HOSPADM

## 2021-10-12 ASSESSMENT — ENCOUNTER SYMPTOMS
DIARRHEA: 0
RHINORRHEA: 0
CHEST TIGHTNESS: 0
WHEEZING: 0
NAUSEA: 0
SINUS PRESSURE: 0
VOMITING: 0
SHORTNESS OF BREATH: 0
SINUS PAIN: 0

## 2021-10-12 ASSESSMENT — SLEEP AND FATIGUE QUESTIONNAIRES
DIFFICULTY ARISING: YES
DO YOU HAVE DIFFICULTY SLEEPING: YES
DO YOU HAVE DIFFICULTY SLEEPING: NO
AVERAGE NUMBER OF SLEEP HOURS: 3
DIFFICULTY FALLING ASLEEP: YES
DO YOU USE A SLEEP AID: NO
SLEEP PATTERN: DISTURBED/INTERRUPTED SLEEP
RESTFUL SLEEP: NO
DIFFICULTY STAYING ASLEEP: YES

## 2021-10-12 ASSESSMENT — PATIENT HEALTH QUESTIONNAIRE - PHQ9: SUM OF ALL RESPONSES TO PHQ QUESTIONS 1-9: 7

## 2021-10-12 NOTE — ED PROVIDER NOTES
[address]  EMERGENCY MEDICINE     Pt Name: Aida Najera  MRN: [id number]  [place] 1970  Date of evaluation: 10/12/2021  PCP:    No primary care provider on file. Provider: KYLE Orr CNP    CHIEF COMPLAINT       Chief Complaint   Patient presents with   [address] Problem    Depression    Paranoid     HISTORY OF PRESENT ILLNESS    Bianka Wilson is a 45 y/o  female with a PMHx of Schizophrenia, Bipolar I Disorder, and Multiple Sclerosis. She presents to the emergency department with a CC of  paranoia and depression. Patient states that the for the past couple days she has had increasing paranoia and depressive thoughts. She was recently at a car dealership with her  and states \"the  is out to get me\" and \"I hate being a door mat\". She feels that people are \"bullying\" her. She also is angry at her son's teacher for changing the time for and IEP meeting. At her last Psych evaluation, patient was prescribed Seroquel. She is no longer taking this medication and does not like the way the medication makes her feel. Patient states that she is currently taking  Abilify at home but has been inconsistent with use. She denies any suicidal or homicidal ideation. Triage notes and Nursing notes were reviewed by myself. Any discrepancies are addressed above. PAST MEDICAL HISTORY     Past Medical History:   Diagnosis Date    Bipolar 1 disorder, mixed, partial remission ([facility] 2012    Depression     Multiple sclerosis ([facility]        SURGICAL HISTORY     History reviewed. No pertinent surgical history.     CURRENT MEDICATIONS       Discharge Medication List as of 10/20/2021 10:17 AM      CONTINUE these medications which have NOT CHANGED    Details   glatiramer (COPAXONE) 20 MG/ML injection Inject 40 mg into the skin three times a week Monday, Wednesday, FridayHistorical Med             ALLERGIES       Allergies   Allergen Reactions    Zyprexa [Olanzapine] Other (See Comments)      Severe confusion       FAMILY HISTORY       Family History   Problem Relation Age of Onset    Mental Illness Mother     Heart Disease Father         SOCIAL HISTORY       Social History     Socioeconomic History    Marital status:      Spouse name: Mohit Edmond Number of children: Eduardo    Years of education: 15    Highest education level: None   Occupational History    None   Tobacco Use    Smoking status: Never Smoker    Smokeless tobacco: Never Used   Vaping Use    Vaping Use: Never used   Substance and Sexual Activity    Alcohol use: No    Drug use: No    Sexual activity: Not Currently     Partners: Male   Other Topics Concern    None   Social History Narrative    None     Social Determinants of Health     Financial Resource Strain:     Difficulty of Paying Living Expenses:    Food Insecurity:     Worried About Running Out of Food in the Last Year:     Ran Out of Food in the Last Year:    Transportation Needs:     Lack of Transportation (Medical):  Lack of Transportation (Non-Medical):    Physical Activity:     Days of Exercise per Week:     Minutes of Exercise per Session:    Stress:     Feeling of Stress :    Social Connections:     Frequency of Communication with Friends and Family:     Frequency of Social Gatherings with Friends and Family:     Attends Yarsani Services:     Active Member of Clubs or Organizations:     Attends Club or Organization Meetings:     Marital Status:    Intimate Partner Violence:     Fear of Current or Ex-Partner:     Emotionally Abused:     Physically Abused:     Sexually Abused:        REVIEW OF SYSTEMS     Review of Systems   Constitutional: Negative for activity change, appetite change, fatigue and fever. HENT: Negative for rhinorrhea, sinus pressure and sinus pain. Respiratory: Negative for chest tightness, shortness of breath and wheezing. Cardiovascular: Negative for chest pain and palpitations. Gastrointestinal: Negative for diarrhea, nausea and vomiting. Musculoskeletal: Negative for arthralgias, joint swelling and myalgias. Allergic/Immunologic: Negative for environmental allergies, food allergies and immunocompromised state. Neurological: Negative for dizziness, weakness, light-headedness and numbness. Patient with h/o MS at baseline    Hematological: Negative for adenopathy. Does not bruise/bleed easily. Psychiatric/Behavioral: Positive for confusion, decreased concentration and dysphoric mood. Negative for hallucinations, self-injury, sleep disturbance and suicidal ideas. The patient is nervous/anxious. Patient with h/o Bipolar I and Schizophrenia at baseline. Patient has fleeting thoughts and ideas and has a difficult time staying on topic. Except as noted above the remainder of the review of systems was reviewed and is negative. SCREENINGS        Cruz Coma Scale  Eye Opening: Spontaneous  Best Verbal Response: Oriented  Best Motor Response: Obeys commands  Cruz Coma Scale Score: 15               PHYSICAL EXAM    (up to 7 for level 4, 8 or more for level 5)     ED Triage Vitals [10/12/21 1409]   BP Temp Temp src Pulse Resp SpO2 Height Weight   (!) 142/86 98.7 °F (37.1 °C) -- 116 20 98 % -- --       Physical Exam  Constitutional:       General: She is not in acute distress. Appearance: She is not ill-appearing, toxic-appearing or diaphoretic. HENT:      Head: Normocephalic and atraumatic. Nose: Nose normal. No congestion or rhinorrhea. Mouth/Throat:      Mouth: Mucous membranes are moist.   Eyes:      General: No scleral icterus. Right eye: No discharge. Left eye: No discharge. Extraocular Movements: Extraocular movements intact. Conjunctiva/sclera: Conjunctivae normal.   Cardiovascular:      Rate and Rhythm: Normal rate and regular rhythm. Pulses: Normal pulses. Heart sounds: Normal heart sounds.  No murmur within normal limits   ANION GAP - Abnormal; Notable for the following components:    Anion Gap 20.0 (*)     All other components within normal limits   ETHANOL   URINE DRUG SCREEN   ACETAMINOPHEN LEVEL   GLOMERULAR FILTRATION RATE, ESTIMATED       All other labs were within normal range or not returned as of this dictation. Please note, any cultures that may have been sent were not resulted at the time of this patient visit. EMERGENCY DEPARTMENT COURSE and Medical Decision Making:     Vitals:    Vitals:    10/18/21 2309 10/19/21 0745 10/19/21 1940 10/20/21 0724   BP: 119/74 139/73 116/76 120/81   Pulse: 106 104 110 104   Resp: 16 16 18 16   Temp: 97.5 °F (36.4 °C) 98.1 °F (36.7 °C) 98.3 °F (36.8 °C) 97.4 °F (36.3 °C)   TempSrc: Oral Oral Oral Oral   SpO2: 97% 96% 95% 98%   Weight:       Height:           PROCEDURES: (None if blank)  Procedures    ED Course as of Oct 27 1504   Tue Oct 12, 2021   1650 Spoke with SW and she states pt would benefit from admission due to paranoia. This provider discussed with pt need to restart some form of medication. Pt states she does not have a psychiatrist, but has been seen at [facility] before. She states she is anticipating being admitted and \"brought clothes\" for it. She states she is unaware what dose of Abilify she was taking. She isn't sure that it even helped. Pt is cleared medically for admission to [facility] if pt and doctor is agreeable. [NW]   6900 Pt has been accepted to  for admission. Pt signed voluntary admit forms. Dr Mamie Jo admitting.     [NW]      ED Course User Index  [NW] Gala Klinefelter, APRN - CNP     MDM    Strict return precautions and follow up instructions were discussed with the patient with which the patient agrees    ED Medications administered this visit:  Medications - No data to display      FINAL IMPRESSION      1.  Bipolar 1 disorder [facility])          DISPOSITION/PLAN   DISPOSITION Decision To Admit 10/12/2021 05:01:58 PM      PATIENT REFERRED TO:  KENDRICK [place] [id number]  [address] [place] [id number] [phone]  Go on 10/25/2021  Go to [facility] office on 10/25/2021 at 9:30 am for your scheduled Diagnostic Assessment with Alonzo Hurst MEDICATIONS:  Discharge Medication List as of 10/20/2021 10:17 AM      START taking these medications    Details   ARIPiprazole (ABILIFY) 15 MG tablet Take 1 tablet by mouth daily, Disp-30 tablet, R-0Normal                    KYLE Jeong CNP (electronically signed)           KYLE Jeong CNP  10/14/21 20 [facility] KYLE Carbone CNP  10/27/21 [id number]

## 2021-10-12 NOTE — ED NOTES
Patient lying in bed with blankets watching tv at this time. Patient respirations are regular and unlabored. Patient having panic attacks on and off throughout stay.  states this is normal. Call light is within reach.        Kahlil Cummings RN  10/12/21 3970 [facility], RN  10/12/21 3270

## 2021-10-12 NOTE — ED NOTES
Kaykay Brambila RN to the safe room to assist patient with changing into proper safe room attire. Patient sitting on floor of the restroom and having panic attack. Patient changed and escorted to room 26 at this time.      Rafita Healy RN  10/12/21 2592

## 2021-10-12 NOTE — ED NOTES
Pt in bed with both side rails down. Patient appears to be crying. Patient is in bed with blankets. Patient is in ligature resistant safe room and officer/ are in the control room watching the monitor at this time. Will continue to monitor.        Marianne Jose RN  10/12/21 [place] PRIYANK Webber  10/12/21 [id number]

## 2021-10-12 NOTE — ED TRIAGE NOTES
Patient presents with concerns of paranoia, and mental health evaluation. Patient has flighty thoughts and struggles to maintain conversation. Patient deviates frequently from asked question. States she is paranoid about everything going on in her life. States she feels like she has been bullied from car lot.

## 2021-10-12 NOTE — Clinical Note
Patient Class: Inpatient [101]   REQUIRED: Diagnosis: Bipolar depression ([facility] [635948]   Estimated Length of Stay: Estimated stay of more than 2 midnights   Telemetry/Cardiac Monitoring Required?: No
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
## 2021-10-13 PROBLEM — F31.5: Status: ACTIVE | Noted: 2021-10-12

## 2021-10-13 PROCEDURE — APPSS30 APP SPLIT SHARED TIME 16-30 MINUTES: Performed by: PHYSICIAN ASSISTANT

## 2021-10-13 PROCEDURE — 90792 PSYCH DIAG EVAL W/MED SRVCS: CPT | Performed by: PSYCHIATRY & NEUROLOGY

## 2021-10-13 PROCEDURE — 1240000000 HC EMOTIONAL WELLNESS R&B

## 2021-10-13 RX ORDER — PALIPERIDONE 3 MG/1
3 TABLET, EXTENDED RELEASE ORAL DAILY
Status: DISCONTINUED | OUTPATIENT
Start: 2021-10-13 | End: 2021-10-14

## 2021-10-13 RX ADMIN — GLATIRAMER 40 MG: 40 INJECTION, SOLUTION SUBCUTANEOUS at 08:42

## 2021-10-13 ASSESSMENT — PAIN DESCRIPTION - PROGRESSION: CLINICAL_PROGRESSION: NOT CHANGED

## 2021-10-13 ASSESSMENT — PAIN DESCRIPTION - LOCATION: LOCATION: GENERALIZED

## 2021-10-13 ASSESSMENT — PAIN DESCRIPTION - PAIN TYPE: TYPE: CHRONIC PAIN

## 2021-10-13 ASSESSMENT — PAIN DESCRIPTION - ORIENTATION: ORIENTATION: OTHER (COMMENT)

## 2021-10-13 ASSESSMENT — PAIN DESCRIPTION - FREQUENCY: FREQUENCY: CONTINUOUS

## 2021-10-13 ASSESSMENT — PAIN DESCRIPTION - DESCRIPTORS: DESCRIPTORS: ACHING;DISCOMFORT

## 2021-10-13 ASSESSMENT — PAIN SCALES - GENERAL: PAINLEVEL_OUTOF10: 10

## 2021-10-13 ASSESSMENT — PAIN DESCRIPTION - ONSET: ONSET: ON-GOING

## 2021-10-13 ASSESSMENT — PAIN - FUNCTIONAL ASSESSMENT: PAIN_FUNCTIONAL_ASSESSMENT: ACTIVITIES ARE NOT PREVENTED

## 2021-10-13 NOTE — H&P
Department of Psychiatry  Psychiatric Assessment   Reason for Admission to Psychiatric Unit:  Acute disordered/bizarre behavior or psychomotor agitation or retardation;interferes with ADLs so that patient cannot function at a less intensive care level of care during evaluation and treatment   Concerns about patient's safety in the community    CHIEF COMPLAINT:  Psychosis    HISTORY OF PRESENT ILLNESS:      Bev Smith is a 44 y.o. female with a history of bipolar I disorder and multiple sclerosis who presented to the emergency department voluntarily due to psychosis. In the ED, patient reported her  brought her to the ED because she is \"out of control\". She reported he is trying to have their son \"provoke her\". Reported her  is a \"\" and she is \"not a doormat\". Reported he \"always camacho his car behind hers\". Reported he \"does not let me be me\". Patient then was talking about how her \"insurance company is bullying her\". Patient reported she has been having increased paranoia since her son was in a car wreck a month ago. Patient stated he is fine but the stress had brought on the paranoia, she does believe that her  and son are trying to set her up. Patient reports she is admitted because \"Im just paranoid. \" She says she always feels that people are out to get her. When asked if she felt that way about any one specifically, she says \"people will prey on anybody that will let them be a doormat. \" She is referring to people walking all over her. She then mentions that people take advantage of her. When asked to elaborate further on this, she cannot. She just says she feels that society is against her. She denies any persecutory delusions and does not believe people are trying to physically harm her. When asked if she feels her  and son are against her she says they provoke her when she is vulnerable and try to agitate her.  She says her  brought her to the ED because \"he said Im angry and violent. \" However, she denies any anger or aggression prior to admission. When asked about recent stressors, she says \"an incident. \" Again, she is not able to elaborate further on this. In the ED, she reported that her son recently was in a car accident which led to increased stress and paranoid. During the interview, she says she is not sure if that increased her paranoia or not. Patient reports she has not been sleeping too good at home due to pain associated with her MS. She initially says she gets 3-4 hours a night but then says some days she does not fall asleep at all. She acknowledges she has a history of bipolar disorder. However, when asked if she has had any recent manic episodes she does not understand what a manic episode is. After explaining to the patient what a manic episode is and the symptoms associated with sari, she says she denies any recent sari. She does endorse some depression recently and rates her depression 5 out of 10 with 10 being the best. She denies any suicidal ideation prior to admission. She reports her appetite has been normal. Energy has been normal throughout the day. Motivation has been okay. She endorses difficulty with attention and concentration at times. She denies feeling worthless, hopeless and helpless. She denies any recent hallucinations. Patient reports she has not seen an outpatient psychiatric provider or taken psychotropic medications since 2019. When she was discharged from  she was instructed to follow up with [facility] and was prescribed Seroquel. She stopped the Seroquel because she did not feel it was working. She then says she didn't feel that she needed medications. Crystal Smallwood is very suspicious and paranoid during the interview. Her affect is flat and she appears depressed. She is a poor historian and is vague and guarded when answering assessment questions.  At times, she appears confused and required clarification regarding an assessment question multiple times. She is also slow to respond at times and appears to exhibit some thought blocking throughout the interview. She mentions she does not feel safe on the unit and rates her not feeling safe 5 out of 10. She tells this writer that she feels the providers are going to do whatever they want without considering what is best for her. When asked what she feels is best for her, she says she is not sure. This writer assured her that she is in a safe place and the nursing staff and providers are here to care for her. It was explained that starting a medication to alleviate her paranoia is what this writer feels is best for her. She is agreeable to this. She denies any hallucinations. Denies any homicidal or suicidal ideation. Patient reported to staff this morning she was having 10 out of 10 generalized pain. Per Lupe Chino RN last night, patient was in bed making loud noises that sound like animal noises. When asked what she was doing patient rambled about how people sleep in different ways. Patient was then moved to a private room where she would not be disruptive to a roommate. PSYCHIATRIC HISTORY:      · Outpatient psychiatric provider:  No one currently. Has not seen [facility] since 2019   · Suicide attempts: denies  · Inpatient psychiatric admissions: Patient was admitted to  in October of 2019 and in 2011    Past psychiatric medications includes:     Abilify, Cogentin, Atarax, Haldol, Seroquel XR, Zyprexa   Adverse reactions from psychotropic medications:    Zyprexa- worsening confusion      Past Medical History:        Diagnosis Date    Bipolar 1 disorder, mixed, partial remission ([facility] [address] 2/21/2012    Depression     Multiple sclerosis ([facility] [address]        Past Surgical History:    History reviewed. No pertinent surgical history.     Medications Prior to Admission:   Medications Prior to Admission: glatiramer (COPAXONE) 20 MG/ML injection, Inject 40 mg into the skin three times a week Monday, Wednesday, Friday  [DISCONTINUED] QUEtiapine (SEROQUEL XR) 50 MG extended release tablet, Take 1 tablet by mouth nightly    Allergies:  Zyprexa [olanzapine]    Social History:   · RESIDENCE: Currently lives in [facility] with her  Lena Vargas and son  · LEVEL OF EDUCATION: Graduated high school   · MARITAL STATUS:    · CHILDREN: One son  · OCCUPATION: On disability due to MS   · PATIENT ASSETS: family supportive     DRUG USE HISTORY  Social History     Tobacco Use   Smoking Status Never Smoker   Smokeless Tobacco Never Used     Social History     Substance and Sexual Activity   Alcohol Use No     Social History     Substance and Sexual Activity   Drug Use No   Denies any alcohol or illicit drug use. UDS negative     LEGAL HISTORY:   HISTORY OF INCARCERATION: no    Family Psychiatric and Medical History: Mother mental illness  denies suicides in family  denies substance use in family        Problem Relation Age of Onset    Mental Illness Mother     Heart Disease Father          Lifetime Psychiatric Review of Systems      ·    Obsessions and Compulsions: denies  ·    Theodora or Hypomania: yes  ·    Hallucinations: denies  ·    Panic Attacks:  denies   ·    Delusions:  paranoid  ·    Phobias: denies       Medical Review of Systems:     Constitutional: Negative for appetite change, diaphoresis, fatigue and fever. HENT: Negative for congestion, sore throat and tinnitus. Eyes: Negative for visual disturbance. Respiratory: Negative for cough, shortness of breath and wheezing. Cardiovascular: Negative for chest pain and leg swelling. Gastrointestinal: Negative for nausea, vomiting, diarrhea. Negative for abdominal pain. Genitourinary: Negative for frequency. Musculoskeletal: Negative for arthralgias, myalgias and neck stiffness. Skin: Negative for puritis. Neurological: Negative for dizziness, weakness and headaches.    All other systems reviewed and are negative. PHYSICAL EXAM:  Vitals:  /70   Pulse 95   Temp 98.9 °F (37.2 °C) (Tympanic)   Resp 18   Ht 5' 4\" (1.626 m)   Wt 125 lb (56.7 kg)   SpO2 98%   BMI 21.46 kg/m²       Physical Exam:    Constitutional: Well developed, well nourished, no acute distress  Eyes: Pupils round and reactive to light bilaterally  Neck:  Supple, no thyromegaly. Cardiovascular:  Normal rate and rhythm, normal S1 and S2. No murmur or gallop on auscultation. Radial pulses 2+ and brisk bilaterally  Lungs: Clear to auscultation bilaterally without wheezing or rales. Musculoskeletal:  Full range of motion in all four extremities. Neurologic:  Cranial nerves II through XII are grossly intact. Normal gait and station.        Mental Status Examination:    Level of consciousness: awake and alert  Appearance:  well-appearing, hospital attire, in chair, good grooming and good hygiene  Behavior/Motor:  guarded  Attitude toward examiner:  Suspicious, vague, darting eye contact at times  Speech: slow to respond  Mood:  dysphoric  Affect:  flat  Thought processes:  Slow, some thought blocking noted  Thought content:  Denies homicidal ideation  Suicidal Ideation:  denies suicidal ideation  Delusions:  paranoid  Perceptual Disturbance:  denies any perceptual disturbance  Cognition: Patient is oriented to person, place, time  Concentration: clinically adequate  Memory: impaired   Insight & Judgement: limited         DSM-5 DIAGNOSIS:    Bipolar I disorder, most recent episode depressed with psychotic features  Rule out schizoaffective disorder    Patient Active Problem List   Diagnosis    Bipolar 1 disorder, mixed, severe ([facility] [address]    Schizophrenia ([facility] [address]    Bipolar depression ([facility] [address]          Psychosocial and Contextual Factors:   Financial  Occupational  Relationship  Legal   Living situation  Educational     Past Medical History:   Diagnosis Date    Bipolar 1 disorder, mixed, partial remission ([facility] [address] 2/21/2012    Depression     Multiple sclerosis ([facility] [address]           TREATMENT PLAN  Risk Management:  close watch per standard protocol  Psychotherapy:  participation in milieu and group and individual sessions with Attending Physician,  and Physician Assistant/CNP  Estimated length of stay: It might take more than 2 midnights to stabilize patient's mood/thoughts and titrate medications to effect. GENERAL PATIENT/FAMILY EDUCATION  Patient will understand basic signs and symptoms, Patient will understand benefits/risks and potential side effects from proposed meds and Patient will understand their role in recovery. Family is  active in patient's care. Patient assets that may be helpful during treatment include: Intent to participate and engage in treatment, sufficient fund of knowledge and intellect to understand and utilize treatments. Risk level: High     Goals:    Reviewed labs  Reviewed EKG  Will obtain records and review them today. Medication adjustment: Will add Invega 3mg daily  Consults: None   Encouraged patient to engage in groups, milieu, and individual therapies offered as part of programing. Behavioral Services  Medicare Certification Upon Admission    I certify that this patient's inpatient psychiatric hospital admission is medically necessary for:   X (1) Treatment which could reasonably be expected to improve this patient's condition,      X (2) Or for diagnostic study;     AND     X (2) The inpatient psychiatric services are provided while the individual is under the care of a physician and are included in the individualized plan of care. Estimated length of stay/service: Greater than two midnights will be required to reach therapeutic levels of medications and to stabilize mood    Plan for post-hospital care:  Follow up with outpatient psychiatric services    Electronically signed by Chepe Dunbar PA-C on 10/13/2021 at 6:30 AM                                           Psychiatry Attending Attestation     I independently saw and evaluated the patient. I reviewed the Advance Practice Provider's documentation above. Any additional comments or changes to the Advance Practice Provider's documentation are stated below otherwise agree with assessment. Patient is a 53-year-old female with history of bipolar disorder presented to the emergency department for thought disorganization and bizarre behavior. Patient was labile and irritable on interview. She was tearful at times stating that her  has been emotionally abusing her. Reports that she has been dealing with constant paranoia that people are out there trying to hurt her. Mentions that she feels like everybody is out there trying to get to her. Has poor attention concentration during the interview. Was exhibiting some flight of ideas. Agree with the plan to add Invega to help with the paranoia. Patient does mention that she has been noncompliant with her previous psychotropic medications and with follow-up care. We will obtain more collateral information from her . Agree with rest of the assessment and plan as above.     Electronically signed by Katherine Cespedes MD on 10/13/21 at 4:16 PM EDT

## 2021-10-13 NOTE — BH NOTE
Group Therapy Note    Date: 10/13/2021  Start Time:  1000  End Time:   3261  Number of Participants: 8    Type of Group: Recreational/Social    Patient's Goal:  Increase socialization. Notes:    Patient participated in a therapeutic jenga and shared her answers with the group.      Status After Intervention:  Unchanged    Participation Level: Interactive    Participation Quality: Sharing      Speech:  hesitant      Thought Process/Content: Logical      Affective Functioning: Flat      Mood: anxious and depressed      Level of consciousness:  Inattentive      Response to Learning: Progressing to goal      Endings: None Reported    Modes of Intervention: Support, Socialization, Exploration and Activity      Discipline Responsible: Psychoeducational Specialist      Signature:  Alexia Steele

## 2021-10-13 NOTE — PLAN OF CARE
Problem: Altered Mood, Psychotic Behavior:  Goal: Able to demonstrate trust by eating, participating in treatment and following staff's direction  Description: Able to demonstrate trust by eating, participating in treatment and following staff's direction  Outcome: Ongoing  Note: Patient eating meals and following staff direction, patient hesitant to take medications. Problem: Altered Mood, Psychotic Behavior:  Goal: Able to verbalize reality based thinking  Description: Able to verbalize reality based thinking  Outcome: Not Met This Shift  Note: Patient states she is \"paranoid that people are out to get her\". Problem: Altered Mood, Psychotic Behavior:  Goal: Ability to achieve adequate nutritional intake will improve  Description: Ability to achieve adequate nutritional intake will improve  Outcome: Ongoing  Note: Patient consumed % of all meals this shift. Problem: Altered Mood, Psychotic Behavior:  Goal: Ability to interact with others will improve  Description: Ability to interact with others will improve  Outcome: Ongoing  Note: Patient out on the unit, noted to be on the fringe with peers. Problem: Altered Mood, Psychotic Behavior:  Goal: Compliance with prescribed medication regimen will improve  Description: Compliance with prescribed medication regimen will improve  Outcome: Ongoing  Note: Patient refused to take Invega, patient stated hat she is allergic to medication. Sabra PERALTA notified. Problem: Altered Mood, Psychotic Behavior:  Goal: Patient specific goal  Description: Patient specific goal  Outcome: Ongoing  Note: Patients goal was to attend group today. Problem: Altered Mood, Psychotic Behavior:  Goal: Verbalizes reality of situation/illness  Outcome: Ongoing  Note: Patient states that Daniel Lawler was brought to the hospital due to paranoia\", patient denies other illnesses at this time.       Problem: Discharge Planning:  Goal: Discharged to appropriate level of care  Description: Discharged to appropriate level of care  Outcome: Ongoing  Note: Discharge planning ongoing at this time. Problem: Anxiety:  Goal: Level of anxiety will decrease  Description: Level of anxiety will decrease  Outcome: Ongoing  Note: Patient states her anxiety is currently a \"2\" on a 0-10 scale but is noticeably anxious and paranoid of other patients on the unit. Problem: Pain:  Goal: Pain level will decrease  Description: Pain level will decrease  Outcome: Not Met This Shift  Note: Patient states pain is a \"10\" on a 0-10 scale. Patient refusing all pain medications at this time. Problem: Pain:  Goal: Control of acute pain  Description: Control of acute pain  Outcome: Ongoing  Note: Patient states pain is a \"10\" on a 0-10 scale. Patient refusing all pain medications at this time. Problem: Pain:  Goal: Control of chronic pain  Description: Control of chronic pain  Outcome: Ongoing  Note: Patient states pain is a \"10\" on a 0-10 scale. Patient refusing all pain medications at this time. Problem: Falls - Risk of:  Goal: Will remain free from falls  Description: Will remain free from falls  Outcome: Met This Shift  Note: Patient remained safe and free from falls this shift. Problem: Falls - Risk of:  Goal: Absence of physical injury  Description: Absence of physical injury  Outcome: Ongoing  Note: Patient remained safe and free form physical injury this shift. Problem: Mobility - Impaired:  Goal: Mobility will improve  Description: Mobility will improve  Outcome: Ongoing     Problem: Coping:  Goal: Ability to identify problematic behaviors that deter socialization will improve  Description: Ability to identify problematic behaviors that deter socialization will improve  10/13/2021 1513 by Thuan Martin RN  Outcome: Ongoing  Note: Patient out on the unit, noted to be on the fringe with peers. Care plan reviewed with patient.   Patient does verbalize understanding of the plan of care and does contribute to goal setting

## 2021-10-13 NOTE — BH NOTE
Patient in bed making loud noises that sound like animal noises, when asked what she is doing patient rambles about people sleep in different ways. Pt moved to a room where she would not be disruptive to a room mate.

## 2021-10-13 NOTE — GROUP NOTE
Group Therapy Note    Date: 10/13/2021    Group Start Time: 1100  Group End Time: 36  Group Topic: Healthy Living/Wellness    [facility] Adult [facility] 4E    Sherly Vogt        Group Therapy Note    Attendees: Patients discussed risk planning and who they can contact in case of emergencies. Patient's Goal:  \"To go to groups. \"    Notes:  Patient has gone to every group today. Status After Intervention:  Improved    Participation Level:  Active Listener and Interactive    Participation Quality: Appropriate and Attentive      Speech:  normal      Thought Process/Content: Linear      Affective Functioning: Congruent      Mood: euthymic      Level of consciousness:  Alert and Attentive      Response to Learning: Able to verbalize/acknowledge new learning, Capable of insight and Progressing to goal      Endings: None Reported    Modes of Intervention: Education and Support      Discipline Responsible: Behavorial Health Tech      Signature:  Winston Sarabia

## 2021-10-13 NOTE — BH NOTE
PLAN OF CARE:     Start Time:  0900  End Time:   0930    Group Topic:  Daily Goals    Group Type:   Goal Group    Intervention/Goal:  To increase support and identify daily goals    Attendance:   attended      Affect:   flat    Behavior:  Cooperative but guarded    Response:  appropriate    Daily Goal:   Go to groups.     Progress:   Progressing to goal

## 2021-10-13 NOTE — BH NOTE
Patient screened positive for suicide risk on CSSR-S (\"yes\" to question #4, 5, OR 6)  no. Physician notified of risk score. Orders received na . 2 person skin assessment completed upon admission refused. Explained patients right to have family, representative or physician notified of their admission. Patient has N/A for physician to be notified. Patient has N/A for family/representative to be notified. Provided pt with [employer] handout entitled \"Quitting Smoking. \"  Reviewed handout with pt addressing dangers of smoking, developing coping skills, and providing basic information about quitting. Pt response to counseling:  Na    Pt calm and cooperative, some confusion but is alert and oriented. Pt reports she has been having increased paranoia since her son was in a car wreck a month ago. Pt states he is fine but the stress had brought on the paranoia, she does believe that her  and son are trying to set her up and also does not trust people on the unit. Pt reports that she does not take psych medications nor pain medications due to all the side effects.            Kecia Grullon RN

## 2021-10-13 NOTE — PROGRESS NOTES
Behavioral Services  Medicare Certification Upon Admission    I certify that this patient's inpatient psychiatric hospital admission is medically necessary for:    [x] (1) Treatment which could reasonably be expected to improve this patient's condition,       [x] (2) Or for diagnostic study;     AND     [x](2) The inpatient psychiatric services are provided while the individual is under the care of a physician and are included in the individualized plan of care.     Estimated length of stay/service 3-5 days    Plan for post-hospital care [address]    Electronically signed by Terrence Hdz MD on 10/13/2021 at 10:14 AM

## 2021-10-13 NOTE — PLAN OF CARE
Patient has attended at least 2 groups today and has also been out of her room to socialize with others this shift so she has met her socialization goal.

## 2021-10-13 NOTE — BH NOTE
INPATIENT RECREATIONAL THERAPY  ADULT BEHAVIORAL SERVICES  EVALUATION    REFERRING PHYSICIAN:  Dr. Daiana Madrigal  DIAGNOSIS:   Bipolar Depression  PRECAUTIONS:   Standard precautions    HISTORY OF PRESENT ILLNESS/INJURY:   Patient was admitted to the unit due to psychosis, depression and paranoia. Patient reported relationship stress with her  and her son. Patient reported that her son was in a car accident about one month ago and this has caused her a lot of stress. Patient also reported that she does not trust others right now and feels like everyone is out to get her. Patient stated that she has had some noncompliance with her medications. Patient also reported poor sleep and anxiety. Patient appeared guarded and confused and had problems answering evaluation questions as well. PMH:  Please see medical chart for prior medical history, allergies, and medication    HISTORY OF PSYCHIATRIC TREATMENT:  IP:   [facility]  OP: Altagracia Burton OF BIRTH:   [birth year]  GENDER:   female  MARITAL STATUS:   with 2 children    EMPLOYMENT STATUS:   unemployed  LIVING SITUATION/SUPPORT:  Lives with her . EDUCATIONAL LEVEL:    graduate    MEDICATION/DRUG USE:  Medication noncompliance. LEISURE INTERESTS:   Coloring, watching TV and movies, nature activities, reading, arts/crafts, listening to music, spiritual activities (patient is Sikh), family activities, playing cards and games, shopping  ACTIVITY PREFERENCE:   Small group OK  ACTIVITY TYPES:   Passive. Indoor. Active. Outdoor. COGNITION:  Alert and oriented to person and place and appeared to have some confusion. COPING:  poor  ATTENTION:   poor  RELAXATION:  Patient reported paranoia, poor sleep and anxiety. SELF-ESTEEM:  fair  MOTIVATION:    poor    SOCIAL SKILLS:   Fair - appeared to have some confusion. FRUSTRATION TOLERANCE:   No history of violent/disruptive behavior.    ATTENTION SEEKING:  None noted  COOPERATION:  Cooperative but guarded  AFFECT:   flat  APPEARANCE:  appropriate    HEARING:   No problems noted  VISION:   Corrected with glasses   VERBAL COMMUNICATION:   guarded - appeared to have some confusion. WRITTEN COMMUNICATION:   No problems noted    COORDINATION:   No problems noted   MOBILITY:  Ambulates independently     GOALS:   Identify 2 new positive coping skills by time of discharge.

## 2021-10-14 PROCEDURE — 99232 SBSQ HOSP IP/OBS MODERATE 35: CPT | Performed by: PSYCHIATRY & NEUROLOGY

## 2021-10-14 PROCEDURE — 6370000000 HC RX 637 (ALT 250 FOR IP): Performed by: PSYCHIATRY & NEUROLOGY

## 2021-10-14 PROCEDURE — 1240000000 HC EMOTIONAL WELLNESS R&B

## 2021-10-14 PROCEDURE — APPSS30 APP SPLIT SHARED TIME 16-30 MINUTES: Performed by: PHYSICIAN ASSISTANT

## 2021-10-14 RX ORDER — ARIPIPRAZOLE 10 MG/1
10 TABLET ORAL DAILY
Status: DISCONTINUED | OUTPATIENT
Start: 2021-10-14 | End: 2021-10-15

## 2021-10-14 RX ADMIN — ARIPIPRAZOLE 10 MG: 10 TABLET ORAL at 13:21

## 2021-10-14 ASSESSMENT — PAIN DESCRIPTION - FREQUENCY
FREQUENCY: CONTINUOUS
FREQUENCY: CONTINUOUS

## 2021-10-14 ASSESSMENT — PAIN DESCRIPTION - ONSET
ONSET: ON-GOING
ONSET: ON-GOING

## 2021-10-14 ASSESSMENT — PAIN DESCRIPTION - PROGRESSION
CLINICAL_PROGRESSION: NOT CHANGED
CLINICAL_PROGRESSION: NOT CHANGED

## 2021-10-14 ASSESSMENT — PAIN DESCRIPTION - LOCATION
LOCATION: GENERALIZED
LOCATION: GENERALIZED

## 2021-10-14 ASSESSMENT — PAIN DESCRIPTION - ORIENTATION: ORIENTATION: OTHER (COMMENT)

## 2021-10-14 ASSESSMENT — PAIN DESCRIPTION - PAIN TYPE
TYPE: CHRONIC PAIN
TYPE: CHRONIC PAIN

## 2021-10-14 ASSESSMENT — PAIN DESCRIPTION - DIRECTION: RADIATING_TOWARDS: ALL OVER

## 2021-10-14 ASSESSMENT — PAIN DESCRIPTION - DESCRIPTORS
DESCRIPTORS: ACHING;DISCOMFORT
DESCRIPTORS: ACHING;DISCOMFORT

## 2021-10-14 ASSESSMENT — PAIN - FUNCTIONAL ASSESSMENT
PAIN_FUNCTIONAL_ASSESSMENT: ACTIVITIES ARE NOT PREVENTED
PAIN_FUNCTIONAL_ASSESSMENT: ACTIVITIES ARE NOT PREVENTED

## 2021-10-14 ASSESSMENT — PAIN SCALES - GENERAL
PAINLEVEL_OUTOF10: 10
PAINLEVEL_OUTOF10: 10

## 2021-10-14 NOTE — FLOWSHEET NOTE
After group, I met individually with Daniela Rodríguez in her room. She is thankful for her health and that God is with her. She wanted prayer. 10/14/21 4495   Encounter Summary   Services provided to: Patient   Continue Visiting Yes  (10/14 )   Complexity of Encounter Moderate   Length of Encounter 15 minutes   Routine   Type Initial   Spiritual/Worship   Type Spiritual support   Care Plan:  Continue spiritual and emotional care for patient and family. Including prayers.

## 2021-10-14 NOTE — BH NOTE
PLAN OF CARE:     Start Time: 0900  End Time:   0930    Group Topic:  Daily Goals    Group Type:   Goal Group    Intervention/Goal:  To increase support and identify daily goals    Attendance:  Attended group    Affect:    blunt    Behavior:  Cooperative but guarded    Response:  Identified a daily goal     Daily Goal:   Relax and believe in myself.      Progress:   Progressing to goal

## 2021-10-14 NOTE — GROUP NOTE
Group Therapy Note    Date: 10/14/2021    Group Start Time: 1330  Group End Time: 1410  Group Topic: Psychotherapy    [facility]    ZENAIDA Wang        Group Therapy Note    Attendees: 5         Patient's Goal:  Discuss with peers \"warning signs\", coping skills and things to look forward to. Notes:  Patient shares with peers that sewing and praying are helpful coping skills for her. Patient looks forward to spending time with her family, especially her son. Status After Intervention:  Improved    Participation Level:  Active Listener and Interactive    Participation Quality: Appropriate, Attentive, Sharing and Supportive      Speech:  normal      Thought Process/Content: Logical      Affective Functioning: Congruent      Mood: euthymic      Level of consciousness:  Alert, Oriented x4 and Attentive      Response to Learning: Able to verbalize current knowledge/experience, Able to verbalize/acknowledge new learning, Able to retain information, Capable of insight, Able to change behavior and Progressing to goal      Endings: None Reported    Modes of Intervention: Support and Socialization      Discipline Responsible: /Counselor      Signature:  ZENAIDA Wang

## 2021-10-14 NOTE — PROGRESS NOTES
Patient ask for this staff RN to come talk to her. The patient reports that she feels that we are against her. Patient states \" You know I'm here because I am paranoid. \" Patient asked if she would like something for anxiety. Patient reports \" I don't have anything to take for anxiety. The Doctor has to order it. \" Patient was informed that she has Atarax if needed. Patient was argumentative that the DrKathnever ordered it. Patientt then accuses this staff RN for not being empathetic. The patient then stated \" I need a place to go on discharge. \" Patient reminded that she will be going home to her spouse and son. Patient states \"You don't know that. I will just be  homeless living under a bridge. No one cares. It's ok someone will probably hurt me. \" Patient would not accept support and encouragement from this staff RN.

## 2021-10-14 NOTE — PROGRESS NOTES
Department of Psychiatry  Progress Note     Chief Complaint:  Bipolar I disorder, most recent episode depressed with mood-congruent psychotic features ([address]     SUBJECTIVE:    PROGRESS:  Darcy Montenegro is seen seated out in the day room. She is cooperative and agrees to speak with this writer in the interview room. Darcy Montenegro states she is doing fine today. Her affect is flat and she appears withdrawn. She is slow to respond and blankly stares at times. Continues to exhibit some thought blocking. She is a poor historian and remains disoriented to situation. She says her  \"wanted me to get back to my regular self. \" She is not able to elaborate further on this. She remains paranoid and suspicious of staff and the providers. She continues to provide vague answers to the assessment questions and is guarded with this writer. She reports she does not know if she feels safe on the unit. When asked why, she says [place] are not here to protect me. To listen to me. \" She reports she is paranoid about \"life. \" When asked to elaborate further, she says \"I dont know. I dont know. I dont know. \" She denies any hallucinations. She continues to feel the \"system\" is against her and out to get her. She says she is not sure if she feels anyone is going to physically harm her. This writer assured her that she is in a safe place and that staff and the psychiatric providers are here to help her. She says that's what everyone keeps telling her. When asked if she is feeling depressed today, she says \"Im worried about whats going to happen. \" When asked what she feels is going to happen, she says \"its life. \" She does endorse anxiety but has been refusing to take Atarax PRN for her anxiety. She has been refusing to take Invega because she is allergic to it. However, she has never taken [facility] before and thought Invega was for pain. She is requesting to take Abilify.  I explained to the patient that Invega and Abilify are in the same class of medications and will help with her paranoia. She reports she was really tired last night and slept better than she has been. Staff reported she slept 7 hours broken last night. She has been eating good on the unit. She has been out on the unit interacting with peers and attending groups. She reports she has been talking on the phone and visiting with her  and son. She states she is having generalized pain of a 10/10 today. Per Kaylee RN last night, \"patient ask for this staff RN to come talk to her. The patient reports that she feels that we are against her. Patient states \" You know I'm here because I am paranoid. \" Patient asked if she would like something for anxiety. Patient reports \" I don't have anything to take for anxiety. The Doctor has to order it. \" Patient was informed that she has Atarax if needed. Patient was argumentative that the never ordered it. Patientt then accuses this staff RN for not being empathetic. The patient then stated \" I need a place to go on discharge. \" Patient reminded that she will be going home to her spouse and son. Patient states \"You don't know that. I will just be  homeless living under a bridge. No one cares. It's ok someone will probably hurt me. \"   When asked about this, patient reports she thinks she will be able to return to her home. She then says \"its like a pyramid. Theres some on top. Some in the middle. Some on the bottom. \" She was not able to explain what this meant. She told Monroe Cramer RN this morning she needs to find housing before discharge. Patient stated \"I don't know if the people will let me go back\", when asked \"what people\", patient stated \"the [place] [id number], the one [place], the people everywhere\". I attempted to speak with the patient's  Alivia Ovalle ([phone]) after obtaining the patient's verbal and written consent to do so to obtain collateral information. Alivia Ovalle did not answer and I was unable to leave a voicemail at this time.      Suicidal ideations: denies    Compliance with medications:  Poor-refusing Invega   Medication side effects: absent  ROS: Patient has new complaints:  no  Sleep quality: 7 hours broken last night per staff   Attending groups: yes      OBJECTIVE      Medications  Current Facility-Administered Medications: ARIPiprazole (ABILIFY) tablet 10 mg, 10 mg, Oral, Daily  acetaminophen (TYLENOL) tablet 650 mg, 650 mg, Oral, Q4H PRN  ibuprofen (ADVIL;MOTRIN) tablet 400 mg, 400 mg, Oral, Q6H PRN  magnesium hydroxide (MILK OF MAGNESIA) 400 MG/5ML suspension 30 mL, 30 mL, Oral, Daily PRN  aluminum & magnesium hydroxide-simethicone (MAALOX) 200-200-20 MG/5ML suspension 30 mL, 30 mL, Oral, Q6H PRN  hydrOXYzine (ATARAX) tablet 50 mg, 50 mg, Oral, TID PRN  glatiramer (COPAXONE) injection 40 mg, 40 mg, SubCUTAneous, Once per day on Mon Wed Fri     Physical     height is 5' 4\" (1.626 m) and weight is 125 lb (56.7 kg). Her tympanic temperature is 99.2 °F (37.3 °C). Her blood pressure is 131/83 and her pulse is 110. Her respiration is 18 and oxygen saturation is 97%.    Lab Results   Component Value Date    WBC 6.7 10/12/2021    HGB 13.8 10/12/2021    HCT 42.1 10/12/2021     10/12/2021    ALT 9 (L) 10/12/2021    AST 12 10/12/2021     (H) 10/12/2021    K 3.6 10/12/2021     10/12/2021    CREATININE 0.6 10/12/2021    BUN 12 10/12/2021    CO2 22 (L) 10/12/2021    TSH 2.400 10/22/2019    INR 0.89 03/13/2019          Mental Status Exam:   Level of consciousness: awake and alert  Appearance:  well-appearing, hospital attire, in chair, good grooming and good hygiene  Behavior/Motor:  guarded  Attitude toward examiner:  Suspicious, vague, blank stares  Speech: slow to respond, low volume  Mood:  \"fine\" per patient   Affect:  flat  Thought processes:  some thought blocking noted  Thought content:  Denies homicidal ideation  Suicidal Ideation:  denies suicidal ideation  Delusions:  paranoid  Perceptual Disturbance:  denies any perceptual disturbance  Cognition: Patient is oriented to person, place, time  Concentration: clinically adequate  Memory: impaired   Insight & Judgement: limited       ASSESSMENT     Bipolar I disorder, most recent episode depressed with mood-congruent psychotic features ([address]   Rule out schizoaffective disorder    PLAN    Patient's symptoms show no change today  Will discontinue Invega and add Abilify 10mg per patient request  Attempt to develop insight, psycho-education and supportive therapy conducted. Probable discharge: TBD  Follow-up: [facility] outpatient, daily while on inpatient unit     Electronically signed by Miracle Astorga PA-C on 10/14/2021 at 1:58 PM Reviewed patient's current plan of care and vital signs with nursing staff. Psychiatry Attending Attestation     I independently saw and evaluated the patient. I reviewed the Advance Practice Provider's documentation above. Any additional comments or changes to the Advance Practice Provider's documentation are stated below otherwise agree with assessment. Patient continues to have significant thought disorganization. She refused to take her Martínez Luc stating that she is allergic to it. However mentions that she never tried the medication in the past.  Has some illogical associations during the interview. Has poor attention and concentration. Continues to be paranoid that people are out there trying to hurt her. Discussed about trying Abilify that worked for her in the past.  She understood and agreed to the plan. Lazara Covington is trying to reach out to her  for collateral information.      Electronically signed by Nitin Velez MD on 10/14/21 at 5:34 PM EDT

## 2021-10-14 NOTE — PROGRESS NOTES
Mya Waite is a 44year old female with history of bipolar 1 disorder. She was brought into the ED by her  who she claims said she was \"out of control. \" She feels like everyone is against her and feels very paranoid. She has not taken her medications since 2019 and is refusing her Franki Latonia currently. She reports being confused about her situation. She has been out on the floor all day and participating in groups. She denies suicidal and homicidal ideation.

## 2021-10-14 NOTE — PATIENT CARE CONFERENCE
[facility]  Initial Interdisciplinary Treatment Plan NOTE    Review Date & Time: 10/13/2021 1137    Patient was in treatment team.  See Multidisciplinary Treatment Team sheet for participants. Admission Type:   Admission Type: Voluntary    Reason for admission:  Reason for Admission: bipolar depression      Estimated Length of Stay Update:  3-5 days  Estimated Discharge Date Update: 3-5 days    PATIENT STRENGTHS:  Patient Strengths Strengths: Communication, Positive Support  Patient Strengths and Limitations:Limitations: Difficulty problem solving/relies on others to help solve problems, Difficult relationships / poor social skills  Addictive Behavior:   Medical Problems:  Past Medical History:   Diagnosis Date    Bipolar 1 disorder, mixed, partial remission ([facility] 2/21/2012    Depression     Multiple sclerosis ([email or web address]        EDUCATION:   Learner Progress Toward Treatment Goals: Reviewed results and recommendations of this team, Reviewed group plan and strategies and Reviewed goals and plan of care    Method: Individual    Outcome: Verbalized understanding and Needs reinforcement    PATIENT GOALS: Stay focused and be positive    PLAN/TREATMENT RECOMMENDATIONS UPDATE:   1. What is the most important thing we can help you with while you are here? Stay focused and be positive  2. Who is your support system? Family  3. Do you have follow-up providers? None currently, history with [facility] (last seen 2019)  4. Do you have the ability to pay for your medications? [facility] [place]  5. Where will you be residing when you leave the hospital?   Patient resides with  and son in [facility]  6. Will need a return to work slip or FMLA paper completion?    No      GOALS UPDATE:   Time frame for Short-Term Goals: Daily    ZENAIDA Wang
observed. Anxiety Symptoms  Anxiety Symptoms: Generalized  Theodora Symptoms  Theodora Symptoms: No problems reported or observed. Psychosis Symptoms  Delusion Type: Paranoid    Suicide Risk CSSR-S:  1) Within the past month, have you wished you were dead or wished you could go to sleep and not wake up? : No  2) Have you actually had any thoughts of killing yourself? : No  6) Have you ever done anything, started to do anything, or prepared to do anything to end your life?: No  Change in Result no Change in Plan of care no      EDUCATION:   Learner Progress Toward Treatment Goals: Reviewed results and recommendations of this team, Reviewed group plan and strategies and Reviewed goals and plan of care    Method: Individual    Outcome: Verbalized understanding, Demonstrated Understanding and Needs reinforcement    PATIENT GOALS: Stay focused & be positive    PLAN/TREATMENT RECOMMENDATIONS UPDATE:  1. How are you progressing toward meeting your main treatment goal?   Patient is feeling better and more positive since admission  2. Are there discharge barriers/lingering problems that need to be addressed? Denies      3. Do you have the ability to pay for your medications? [facility] [place]      4. How is your group participation?      Patient has attended and participated in most groups    GOALS UPDATE:   Time frame for Short-Term Goals: Daily      ZENAIDA Verdugo

## 2021-10-14 NOTE — GROUP NOTE
Group Therapy Note    Date: 10/14/2021    Group Start Time: 1100  Group End Time: [phone]  Group Topic: Healthy Living/Wellness    [facility]    Montrell Enamorado RN        Group Therapy Note                 Notes:  Student led group on 2 way communication    Status After Intervention:  Improved    Participation Level:  Active Listener    Participation Quality: Appropriate      Speech:  normal      Thought Process/Content: Logical      Affective Functioning: Congruent      Mood: anxious and depressed      Level of consciousness:  Alert      Response to Learning: Able to verbalize current knowledge/experience      Endings: None Reported    Modes of Intervention: Education      Discipline Responsible: Registered Nurse      Signature:  Montrell Enamorado RN

## 2021-10-14 NOTE — PROGRESS NOTES
This RN has reviewed and agrees with student nurse Aj hendricks and has collaborated with this student nurse regarding the assessment and documentation.

## 2021-10-14 NOTE — PLAN OF CARE
Problem: Altered Mood, Psychotic Behavior:  Goal: Able to demonstrate trust by eating, participating in treatment and following staff's direction  Description: Able to demonstrate trust by eating, participating in treatment and following staff's direction  10/14/2021 1202 by Jodie Schaefer RN  Outcome: Ongoing  Note: Patient eating meals, attending groups, refused to take Michelle Watson, requested to take Abilify, orders received for Abilify, patient requesting to take after lunch. Problem: Altered Mood, Psychotic Behavior:  Goal: Able to verbalize reality based thinking  Description: Able to verbalize reality based thinking  10/14/2021 1202 by Jodie Schaefer RN  Outcome: Ongoing  Note: Patient alert and oriented x 3, not to situation. Problem: Altered Mood, Psychotic Behavior:  Goal: Ability to achieve adequate nutritional intake will improve  Description: Ability to achieve adequate nutritional intake will improve  10/14/2021 1202 by Jodie Schaefer RN  Outcome: Ongoing  Note: Patient eating 100% of meals today. Encourage patient to drink supplements for increase calorie intake. Problem: Altered Mood, Psychotic Behavior:  Goal: Ability to interact with others will improve  Description: Ability to interact with others will improve  10/14/2021 1202 by Jodie Schaefer RN  Outcome: Ongoing  Note: Patient has fair interaction with peers. Problem: Altered Mood, Psychotic Behavior:  Goal: Compliance with prescribed medication regimen will improve  Description: Compliance with prescribed medication regimen will improve  10/14/2021 1202 by Jodie Schaefer RN  Outcome: Ongoing  Note: New orders for Abilify per patient request.     Problem: Altered Mood, Psychotic Behavior:  Goal: Patient specific goal  Description: Patient specific goal  10/14/2021 1202 by Jodie Schaefer RN  Outcome: Ongoing  Note: Patient reports goal for today is \"to relax and believe in myself\".   Patient continues to work towards by Josesito Rod RN  Outcome: Ongoing  Note: Patient ambulates without assistance. Care plan reviewed with patient. Patient verbalize understanding of the plan of care and contribute to goal setting.

## 2021-10-14 NOTE — FLOWSHEET NOTE
Spiritual Support Group Note    Number of Participants in Group: 6                             Time: 1430    Goal: Relief from isolation and loneliness             Cayla Sharing             Self-understanding and gain insight              Acceptance and belonging            Recognize they are not alone                Socialization             Empowerment       Encouragement    Topic:  [x] Spiritual Wellness and Self Care                  [x] Hope                     [x] Connecting with Divine/Others        [x] Thankfulness and Gratitude               [x]  Meaningfulness and Purpose               [] Forgiveness               [] Peace               [] Connect to [employer]     [] Other:    Participation Level:   [x] Active Listener   [] Minimal   [] Monopolizing   [x] Interactive   [] No Participation   []  Other:     Attention:   [x] Alert   [] Distractible   [] Drowsy   [] Poor   [] Other:    Manner:   [x] Cooperative   [] Suspicious   [] Withdrawn   [] Guarded   [] Irritable   [] Inhospitable   [] Other:     Others Comments from Group: Mi Michaels participated in the spirituality group. He/she learned the following spiritual need:  Belonging, meaning and purpose, acceptance, values, awe. He/she has the  mini ways of wellness paper. She chose the spiritual tool of arts/creativity and music.

## 2021-10-14 NOTE — PLAN OF CARE
Patient has attended all of the groups today and has also been out of her room to socialize with others out on the unit so she has met her socialization goal for this shift.

## 2021-10-14 NOTE — PROGRESS NOTES
Group Therapy Note    Date: 10/13/2021  Start Time: 2000  End Time:  2020      Type of Group: Wrap-Up and relaxation        Patient's Goal:  To go to groups. Notes:  Met    Status After Intervention:  Unchanged    Participation Level:  Active Listener and Interactive    Participation Quality: Appropriate, Attentive and Sharing      Speech:  normal      Thought Process/Content: Linear  Delusional      Affective Functioning: Congruent and Flat      Mood: anxious and depressed      Level of consciousness:  Alert and Oriented x4      Response to Learning: Capable of insight      Endings: None Reported    Modes of Intervention: Support and Socialization      Discipline Responsible: Registered Nurse      Signature:  Kristen Bermeo RN

## 2021-10-14 NOTE — BH NOTE
Group Therapy Note    Date: 10/14/2021     Start Time: 1000  End Time:   3634    Number of Participants:   7    Type of Group: Recreational/Social    Patient's Goal:   Increase socialization and concentration. Notes:   Patient participated in coping skills bingo and was able to identify a couple of positive coping skills. Patient was social at times with good concentration.      Status After Intervention:  Improved    Participation Level: Interactive    Participation Quality: Appropriate, Attentive and Sharing      Speech:   hesitant      Thought Process/Content: Logical      Affective Functioning: Blunted      Mood: anxious and depressed      Level of consciousness:  Alert and Attentive      Response to Learning: Progressing to goal      Endings: None Reported    Modes of Intervention: Education, Socialization, Exploration and Activity      Discipline Responsible: Psychoeducational Specialist      Signature:  Juli Turner

## 2021-10-14 NOTE — PLAN OF CARE
Problem: Altered Mood, Psychotic Behavior:  Goal: Able to demonstrate trust by eating, participating in treatment and following staff's direction  10/13/2021 2339 by Marcos Adame RN  Outcome: Ongoing  Note: Patient has been out on the unit although does not interact with her peers. 10/13/2021 1513 by Donny Vega RN  Outcome: Ongoing  Note: Patient eating meals and following staff direction, patient hesitant to take medications. Goal: Able to verbalize reality based thinking  10/13/2021 2339 by Marcos Adame RN  Outcome: Not Met This Shift  Note: Patient remains paranoid of the people around her. 10/13/2021 1513 by Donny Vega RN  Outcome: Not Met This Shift  Note: Patient states she is \"paranoid that people are out to get her\". Goal: Ability to achieve adequate nutritional intake will improve  10/13/2021 2339 by Marcos Adame RN  Outcome: Ongoing  Note: Patient reports that she did not eat her dinner related to the food choices she made was not appealing to her. Patient had a small snack this shift. 10/13/2021 1513 by Donny Vega RN  Outcome: Ongoing  Note: Patient consumed % of all meals this shift. Goal: Ability to interact with others will improve  10/13/2021 2339 by Marcos Adame RN  Outcome: Not Met This Shift  Note: Patient is out on the unit although has no interaction with her peers. 10/13/2021 1513 by Donny Vega RN  Outcome: Ongoing  Note: Patient out on the unit, noted to be on the fringe with peers. Goal: Compliance with prescribed medication regimen will improve  10/13/2021 2339 by Marcos Adame RN  Outcome: Met This Shift  Note: Patient declined pain medication for her complaints of generalized pain of a #10. Patient reports that the Klarissa Gonzalez is on her shoulders. 10/13/2021 1513 by Donny Vega RN  Outcome: Ongoing  Note: Patient refused to take Invega, patient stated hat she is allergic to medication. Robert PERALTA notified.    Goal: Patient specific goal  10/13/2021 2339 by Jessica Curtis RN  Outcome: Met This Shift  Note: Patient reports that her goal was to go to groups. 10/13/2021 1513 by Kashmir Call RN  Outcome: Ongoing  Note: Patients goal was to attend group today. Goal: Verbalizes reality of situation/illness  10/13/2021 2339 by Jessica Curtis RN  Outcome: Not Met This Shift  Note: Patient does not verbalize understanding  10/13/2021 1513 by Kashmir Call RN  Outcome: Ongoing  Note: Patient states that Ronen De Leon was brought to the hospital due to paranoia\", patient denies other illnesses at this time. Problem: Discharge Planning:  Goal: Discharged to appropriate level of care  10/13/2021 2339 by Jessica Curtis RN  Outcome: Ongoing  Note: Discharge planning is in process. 10/13/2021 1513 by Kashmir Call RN  Outcome: Ongoing  Note: Discharge planning ongoing at this time. Problem: Anxiety:  Goal: Level of anxiety will decrease  10/13/2021 2339 by Jessica Curtis RN  Outcome: Ongoing  Note: Patient continues to have periods of increased anxiety. Patient declines  prn medication to assist with anxiety. 10/13/2021 1513 by Kashmir Call RN  Outcome: Ongoing  Note: Patient states her anxiety is currently a \"2\" on a 0-10 scale but is noticeably anxious and paranoid of other patients on the unit. Problem: Pain:  Goal: Pain level will decrease  10/13/2021 2339 by Jessica Curtis RN  Outcome: Ongoing  Note: Patient reports generalized pain of a #10 although declines tary prn pain medication. 10/13/2021 1513 by Kashmir Call RN  Outcome: Not Met This Shift  Note: Patient states pain is a \"10\" on a 0-10 scale. Patient refusing all pain medications at this time. Goal: Control of acute pain  10/13/2021 2339 by Jessica Curtis RN  Outcome: Completed  10/13/2021 1513 by Kashmir Call RN  Outcome: Ongoing  Note: Patient states pain is a \"10\" on a 0-10 scale. Patient refusing all pain medications at this time.   Goal: Control of chronic pain  10/13/2021 2339 by Marcos Adame RN  Outcome: Completed  10/13/2021 1513 by Donny Vega RN  Outcome: Ongoing  Note: Patient states pain is a \"10\" on a 0-10 scale. Patient refusing all pain medications at this time. Problem: Falls - Risk of:  Goal: Will remain free from falls  10/13/2021 2339 by Marcos Adame RN  Outcome: Met This Shift  Note: Patient remains free fom falls this shift. 10/13/2021 1513 by Donny Vega RN  Outcome: Met This Shift  Note: Patient remained safe and free from falls this shift. Goal: Absence of physical injury  10/13/2021 2339 by Marcos Adame RN  Outcome: Completed  10/13/2021 1513 by Donny Vega RN  Outcome: Ongoing  Note: Patient remained safe and free form physical injury this shift. Problem: Mobility - Impaired:  Goal: Mobility will improve  10/13/2021 2339 by Marcos Adame RN  Outcome: Ongoing  Note: Patient has been ambulating on the unit with a steady gait this shift. 10/13/2021 1513 by Donny Vega RN  Outcome: Ongoing     Problem: Coping:  Goal: Ability to identify problematic behaviors that deter socialization will improve  10/13/2021 2339 by Marcos Adame RN  Outcome: Ongoing  10/13/2021 1513 by Donny Vega RN  Outcome: Ongoing  Note: Patient out on the unit, noted to be on the fringe with peers.    10/13/2021 1418 by Jeremiah Aceves  Outcome: Met This Shift

## 2021-10-14 NOTE — GROUP NOTE
Group Therapy Note    Date: 10/13/2021    Group Start Time: 1340  Group End Time: 1320  Group Topic: Psychotherapy    [facility]    ZENAIDA Verdugo        Group Therapy Note         Patient's Goal:  Discuss with peers how one's experience with their mental illness and/or addiction has impacted their lives. Share what one would like others to understand. Notes:  Patient shared with peers that she feels self-motivation is important and is her biggest coping skill. Status After Intervention:  Improved    Participation Level:  Active Listener    Participation Quality: Appropriate, Attentive and Supportive      Speech:  normal      Thought Process/Content: Logical      Affective Functioning: Congruent      Mood: euthymic      Level of consciousness:  Alert, Oriented x4 and Attentive      Response to Learning: Able to verbalize current knowledge/experience, Able to verbalize/acknowledge new learning, Able to retain information, Capable of insight, Able to change behavior and Progressing to goal      Endings: None Reported    Modes of Intervention: Support and Socialization      Discipline Responsible: /Counselor      Signature:  ZENAIDA Verdugo

## 2021-10-15 PROCEDURE — 6370000000 HC RX 637 (ALT 250 FOR IP): Performed by: PSYCHIATRY & NEUROLOGY

## 2021-10-15 PROCEDURE — 1240000000 HC EMOTIONAL WELLNESS R&B

## 2021-10-15 PROCEDURE — 99232 SBSQ HOSP IP/OBS MODERATE 35: CPT | Performed by: PSYCHIATRY & NEUROLOGY

## 2021-10-15 PROCEDURE — APPSS30 APP SPLIT SHARED TIME 16-30 MINUTES: Performed by: PHYSICIAN ASSISTANT

## 2021-10-15 RX ORDER — ARIPIPRAZOLE 15 MG/1
15 TABLET ORAL DAILY
Status: DISCONTINUED | OUTPATIENT
Start: 2021-10-16 | End: 2021-10-20 | Stop reason: HOSPADM

## 2021-10-15 RX ADMIN — GLATIRAMER 40 MG: 40 INJECTION, SOLUTION SUBCUTANEOUS at 08:24

## 2021-10-15 RX ADMIN — ARIPIPRAZOLE 10 MG: 10 TABLET ORAL at 08:24

## 2021-10-15 ASSESSMENT — PAIN DESCRIPTION - DESCRIPTORS
DESCRIPTORS: ACHING
DESCRIPTORS: ACHING;DISCOMFORT

## 2021-10-15 ASSESSMENT — PAIN DESCRIPTION - FREQUENCY
FREQUENCY: CONTINUOUS
FREQUENCY: CONTINUOUS

## 2021-10-15 ASSESSMENT — PAIN SCALES - GENERAL
PAINLEVEL_OUTOF10: 10
PAINLEVEL_OUTOF10: 10

## 2021-10-15 ASSESSMENT — PAIN DESCRIPTION - ONSET
ONSET: ON-GOING
ONSET: ON-GOING

## 2021-10-15 ASSESSMENT — PAIN DESCRIPTION - LOCATION
LOCATION: GENERALIZED
LOCATION: GENERALIZED

## 2021-10-15 ASSESSMENT — PAIN DESCRIPTION - PAIN TYPE
TYPE: CHRONIC PAIN
TYPE: CHRONIC PAIN

## 2021-10-15 ASSESSMENT — PAIN DESCRIPTION - PROGRESSION
CLINICAL_PROGRESSION: NOT CHANGED
CLINICAL_PROGRESSION: NOT CHANGED

## 2021-10-15 ASSESSMENT — PAIN DESCRIPTION - ORIENTATION: ORIENTATION: OTHER (COMMENT)

## 2021-10-15 NOTE — GROUP NOTE
Group Therapy Note    Date: 10/15/2021    Group Start Time: 1115  Group End Time: 1215  Group Topic: Psychotherapy    [facility]    ABHAY Swain, ZENAIDA        Group Therapy Note    Attendees: 7         Patient's Goal:  Patient goal was to explore how thoughts affect patient mood     Notes:  Patient participated in psychotherapy group. Patient shared that her goal is to worry less. Status After Intervention:  Improved    Participation Level:  Active Listener    Participation Quality: Appropriate, Attentive, Sharing and Supportive      Speech:  normal      Thought Process/Content: Logical      Affective Functioning: Congruent      Mood: euthymic      Level of consciousness:  Alert, Oriented x4 and Attentive      Response to Learning: Able to verbalize current knowledge/experience, Able to verbalize/acknowledge new learning, Able to retain information, Capable of insight, Able to change behavior and Progressing to goal        Endings: None Reported    Modes of Intervention: Education, Support, Socialization, Exploration, Clarifying, Problem-solving and Activity      Discipline Responsible: /Counselor      Signature:  ABHAY Swain LSW

## 2021-10-15 NOTE — PLAN OF CARE
Problem: Altered Mood, Psychotic Behavior:  Goal: Able to demonstrate trust by eating, participating in treatment and following staff's direction  Description: Able to demonstrate trust by eating, participating in treatment and following staff's direction  10/14/2021 2237 by Russ Cosby RN  Outcome: Ongoing  Note: Pt is taking medications, attending and participating in groups and care planning. Pt has good interaction with staff and peers   10/14/2021 1202 by Edith Zaman RN  Outcome: Ongoing  Note: Patient eating meals, attending groups, refused to take [facility], requested to take Abilify, orders received for Abilify, patient requesting to take after lunch. Goal: Able to verbalize reality based thinking  Description: Able to verbalize reality based thinking  10/14/2021 2237 by Russ Cosby RN  Outcome: Ongoing  Note: Alert and oriented to all and that she is paranoid but not oriented to extent of paranoia  10/14/2021 1202 by Edith Zaman RN  Outcome: Ongoing  Note: Patient alert and oriented x 3, not to situation. Goal: Ability to achieve adequate nutritional intake will improve  Description: Ability to achieve adequate nutritional intake will improve  10/14/2021 2237 by Russ Cosby RN  Outcome: Ongoing  Note: Eating well and had 100% of snack  10/14/2021 1202 by Edith Zaman RN  Outcome: Ongoing  Note: Patient eating 100% of meals today. Encourage patient to drink supplements for increase calorie intake. Goal: Ability to interact with others will improve  Description: Ability to interact with others will improve  10/14/2021 2237 by Russ Cosby RN  Outcome: Ongoing  Note: Out on unit with some interaction with a few of her peers  10/14/2021 1202 by Edith Zaman RN  Outcome: Ongoing  Note: Patient has fair interaction with peers.   Goal: Compliance with prescribed medication regimen will improve  Description: Compliance with prescribed medication regimen will improve  10/14/2021 2237 by Saira Metcalf RN  Outcome: Ongoing  Note: No medications prescribed at this time and denied need for PRN pain medications  10/14/2021 1202 by Ismael Narvaez RN  Outcome: Ongoing  Note: New orders for Abilify per patient request.  Goal: Patient specific goal  Description: Patient specific goal  10/14/2021 2237 by Saira Metcalf RN  Outcome: Ongoing  Note: Pt working on goal to relax and take pride in self  10/14/2021 1202 by Ismael Narvaez RN  Outcome: Ongoing  Note: Patient reports goal for today is \"to relax and believe in myself\". Patient continues to work towards goal.    Goal: Verbalizes reality of situation/illness  10/14/2021 2237 by Saria Metcalf RN  Outcome: Ongoing  Note: Alert and oriented to all and that she is paranoid but not oriented to extent of paranoia  10/14/2021 1202 by Ismael Narvaez RN  Outcome: Ongoing  Note: Patient unable to report why she is here at present time. Problem: Discharge Planning:  Goal: Discharged to appropriate level of care  Description: Discharged to appropriate level of care  10/14/2021 2237 by Saira Metcalf RN  Outcome: Ongoing  Note: To be discharged home with  and follow with Luis Albertomans  10/14/2021 1202 by Ismael Narvaez RN  Outcome: Ongoing  Note: Patient voices she needs to find housing before discharge. Patient states \"I don't know if the people will let me go back\", when ask \"what people\", patient states \"the one on [employer], the one [place], the people everywhere\". Discharge planner working with patient to achieve optimal discharge plans, specific to individual needs. Problem: Anxiety:  Goal: Level of anxiety will decrease  Description: Level of anxiety will decrease  10/14/2021 2237 by Saira Metcalf RN  Outcome: Ongoing  Note: Reports some anxiety and depression but states more paranoid and fearful   10/14/2021 1202 by Ismael Narvaez RN  Outcome: Ongoing  Note: Patient reports anxiety.  Patient refused to take Atarax PRN this am.       Problem: Pain:  Goal: Pain level will decrease  Description: Pain level will decrease  10/14/2021 2237 by Garcia Guzman RN  Outcome: Ongoing  Note: Reports chronic 10/10 all over from [address]  10/14/2021 1202 by Jonelle Solis RN  Outcome: Ongoing  Note: Pain Assessment: 0-10  Pain Level: 10   Patient's Stated Pain Goal: No pain   Is pain goal met at this time? NA  Patient reports generalized pain, refused to take PRN pain medication,states \"I have the Lord on my shoulders\". Non-Pharmaceutical Pain Intervention(s): Declines (refused medication)       Problem: Falls - Risk of:  Goal: Will remain free from falls  Description: Will remain free from falls  10/14/2021 2237 by Garcia Guzman RN  Outcome: Ongoing  Note: Patient remained safe and free from harm   10/14/2021 1202 by Jonelle Solis RN  Outcome: Ongoing  Note: No falls were observed or reported so far this shift, limps when ambulating and wears non-skid slippers socks. Encourage patient to wear shower shoes while in the shower. Remains on fall precautions. Problem: Mobility - Impaired:  Goal: Mobility will improve  Description: Mobility will improve  10/14/2021 2237 by Garcia Guzman RN  Outcome: Ongoing  Note: Pt out on unit with good mobility and no complaints at this time  10/14/2021 1202 by Jonelle Solis RN  Outcome: Ongoing  Note: Patient ambulates without assistance. Problem: Coping:  Goal: Ability to identify problematic behaviors that deter socialization will improve  Description: Ability to identify problematic behaviors that deter socialization will improve  10/14/2021 2237 by Garcia Guzman RN  Outcome: Ongoing  Note: Pt out on unit with good interaction with certain peers  [id number]/56/[id number] 1424 by Gennett Gosselin  Outcome: Met This Shift   Care plan reviewed with patient and verbalize understanding of the plan of care and contribute to goal setting.

## 2021-10-15 NOTE — BH NOTE
PLAN OF CARE:     Start Time: 0900  End Time:  0930    Group Topic:  Daily Goals    Group Type:   Goal Group    Intervention/Goal:  To increase support and identify daily goals    Attendance:   Attended group    Affect:   flat    Behavior:  cooperative    Response:  Identified a daily goal    Daily Goal:    To go home at one point.     Progress:   Progressing to goal

## 2021-10-15 NOTE — BH NOTE
Group Therapy Note    Date: 10/15/2021     Start Time:  1000  End Time:   0403    Number of Participants: 7    Type of Group: Recreational/Social    Patient's Goal:  Increase socialization and concentration. Notes:   Patient participated in a group game activity and was social with others.      Status After Intervention:  Improved    Participation Level: Interactive    Participation Quality: Appropriate, Attentive and Sharing      Speech:  hesitant      Thought Process/Content: Logical      Affective Functioning: Flat      Mood: depressed      Level of consciousness:  Oriented x4      Response to Learning: Progressing to goal      Endings: None Reported    Modes of Intervention: Socialization, Exploration and Activity      Discipline Responsible: Psychoeducational Specialist      Signature:  Forestine Lundborg

## 2021-10-15 NOTE — PROGRESS NOTES
Department of Psychiatry  Progress Note     Chief Complaint:  Bipolar I disorder, most recent episode depressed with mood-congruent psychotic features ([address]     SUBJECTIVE:    PROGRESS:  Shantelle Mathur is seen seated out in the day room. She is cooperative and agrees to speak with this writer in the interview room. Shantelle Mathur states she is feeling uneasy today. When asked why, she says \"I did the shower routine wrong. \" She reports she took a shower at 630am this morning and was not supposed to take one until 7 am. She says \"its not in the code. \" She believes she is going to be in trouble and have consequences for the \"mistake\" she made. She becomes tearful when talking about this. This writer assured her that she is not in trouble for taking a shower this morning. Patient responded by saying she does not believe I am telling the truth and that she is suspicious of me. Her affect is flat and she appears withdrawn. She is slow to respond and blankly stares at times. Continues to exhibit some thought blocking. She is a poor historian and says she is admitted because she is paranoid. When asked what she was paranoid about, she says she doesn't know. She remains paranoid and suspicious of staff and the providers. She continues to provide vague answers to the assessment questions and is guarded with this writer. She reports she does not know if she feels safe on the unit. When asked what is not making her feel safe, she says \"I dont know. Its complicated. \" She denies any hallucinations. This writer assured her that she is in a safe place and that staff and the psychiatric providers are here to help her. She denies feeling depressed today but does endorse anxiety. She says she is anxious about the mistake she made with the shower. She has been refusing to take Atarax PRN for her anxiety. She has been compliant with her Abilify and denies any side effects. She reports she was slept in and out last night.   Staff reported she slept 5.5 hours broken last night. She has been eating good on the unit. She has been out on the unit interacting with peers and attending groups. She reports she has been talking on the phone with her  and son. Her father visited her yesterday. She states she is still having generalized pain of a 10/10 today. I spoke with the patient's  Gordon Camacho ([phone]) after obtaining the patient's verbal and written consent to do so to obtain collateral information. Gordon Camacho reports Sharda Rain has a history of bipolar disorder. Gordon Camacho says it seems like every 2 years something happens. Gordon Camacho states Sharad Rain has not been taking any medications for her bipolar disorder for almost 2 years. He says she will stay on them for awhile but then will stop. He feels stress seems to trigger her bipolar symptoms. Patriciaramonita Janice reports their son recently got into an accident and totaled the car. Gordon Camacho reports they had to file a claim and Gordon Camacho feels the stress of that caused Sharda Rain to have this episode. Sharda Rain started getting delusional saying that they had no insurance on the car and thought [employer] was going to come take the house and all of her stuff. Sharda Rain was paranoid that [employer] had power of  and was scamming them. Sharda Rain also believed their son Juana Murillo killed the other  but the other  was unharmed. Gordon Camacho kept reassuring Sharda Rain they did have insurance and would show her the facts that [employer] was not going to take their things. At times, Sharda Rain would believe him but then would become delusional again. Sharda Rain was the one that found a car for their son Juana Murillo. Sharda Rain then was saying the car dealership was a scam. He feels her delusions always revolve around money. 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 Gordon Camacho says they were doing an audit and Sharda Rain became very paranoid that her employees were stealing from her. He tried to get her back on her medications and she did start taking  Abilify again.  He says it got to the point where he felt she needed to go to the hospital. Kasie Tovar says Veronica Singh would wake up during the night and question him about things that were happening with the insurance company. He says at one point, the trash can went by and she felt it was the insurance company taking her car away. Kasie Heart said she was eating well and maintaining her personal hygiene at home. Suicidal ideations: denies    Compliance with medications:  Good- taking Abilify  Medication side effects: absent  ROS: Patient has new complaints:  no  Sleep quality: 5.5 hours broken last night per staff   Attending groups: yes      OBJECTIVE      Medications  Current Facility-Administered Medications: [START ON 10/16/2021] ARIPiprazole (ABILIFY) tablet 15 mg, 15 mg, Oral, Daily  acetaminophen (TYLENOL) tablet 650 mg, 650 mg, Oral, Q4H PRN  ibuprofen (ADVIL;MOTRIN) tablet 400 mg, 400 mg, Oral, Q6H PRN  magnesium hydroxide (MILK OF MAGNESIA) 400 MG/5ML suspension 30 mL, 30 mL, Oral, Daily PRN  aluminum & magnesium hydroxide-simethicone (MAALOX) 200-200-20 MG/5ML suspension 30 mL, 30 mL, Oral, Q6H PRN  hydrOXYzine (ATARAX) tablet 50 mg, 50 mg, Oral, TID PRN  glatiramer (COPAXONE) injection 40 mg, 40 mg, SubCUTAneous, Once per day on Mon Wed Fri     Physical     height is 5' 4\" (1.626 m) and weight is 125 lb (56.7 kg). Her tympanic temperature is 97.8 °F (36.6 °C). Her blood pressure is 115/71 and her pulse is 115. Her respiration is 18 and oxygen saturation is 98%.    Lab Results   Component Value Date    WBC 6.7 10/12/2021    HGB 13.8 10/12/2021    HCT 42.1 10/12/2021     10/12/2021    ALT 9 (L) 10/12/2021    AST 12 10/12/2021     (H) 10/12/2021    K 3.6 10/12/2021     10/12/2021    CREATININE 0.6 10/12/2021    BUN 12 10/12/2021    CO2 22 (L) 10/12/2021    TSH 2.400 10/22/2019    INR 0.89 03/13/2019          Mental Status Exam:   Level of consciousness: awake and alert  Appearance:  well-appearing, hospital attire, in chair, good grooming and good hygiene  Behavior/Motor:  guarded  Attitude toward examiner:  Suspicious, vague, blank stares  Speech: slow to respond, low volume  Mood:  \"fine\" per patient   Affect:  flat  Thought processes:  illogical associations, some thought blocking noted  Thought content:  Denies homicidal ideation  Suicidal Ideation:  denies suicidal ideation  Delusions:  paranoid  Perceptual Disturbance:  denies any perceptual disturbance  Cognition: Patient is oriented to person, place, time  Concentration: clinically adequate  Memory: impaired   Insight & Judgement: limited       ASSESSMENT     Bipolar I disorder, most recent episode depressed with mood-congruent psychotic features ([facility] [address]   Rule out schizoaffective disorder    PLAN    Patient's symptoms show no change today  Will increase Abilify to 15mg daily starting tomorrow   Attempt to develop insight, psycho-education and supportive therapy conducted. Probable discharge: TBD  Follow-up: Boyd Mcneill outpatient, daily while on inpatient unit     Electronically signed by Miracle Astorga PA-C on 10/15/2021 at 2:07 PM Reviewed patient's current plan of care and vital signs with nursing staff. Psychiatry Attending Attestation     I independently saw and evaluated the patient. I reviewed the Advance Practice Provider's documentation above. Any additional comments or changes to the Advance Practice Provider's documentation are stated below otherwise agree with assessment. Patient continues to have paranoid delusions that several people are out there trying to get to her. She was very fixated that she is going to be punished here because she did not take shower in the time requested by the nursing staff. Even though nursing staff reassured her multiple times that she is not in any type of trouble patient continues to have this perseverating thoughts. Minimal improvement noticed today.   Continues to respond to internal stimuli at times. Has illogical associations. Agree with the plan to continue to titrate her Abilify. Michael Kiowa to continue reaching out to her  for collateral information.      Electronically signed by Robe Lundberg MD on 10/15/21 at 8:49 PM EDT

## 2021-10-15 NOTE — PLAN OF CARE
Patient has attended all of the groups today and has also been out of her room all day to visit and socialize with others this shift so she has met her socialization goal.

## 2021-10-15 NOTE — BH NOTE
Group Therapy Note    Date: 10/14/2021  Start Time: 2000  End Time:  2020  Number of Participants: 1    Type of Group: Wrap-Up    Wellness Binder Information  Module Name:    Session Number:      Patient's Goal:  Relax and take in self    Notes: Working on goal    Status After Intervention:  Unchanged    Participation Level: Active Listener    Participation Quality: Attentive      Speech:  hesitant      Thought Process/Content: Delusional      Affective Functioning: Flat      Mood: anxious      Level of consciousness:  Alert      Response to Learning: Able to change behavior      Endings: None Reported    Modes of Intervention: Education      Discipline Responsible: Registered Nurse      Signature:   Miguel Christianson RN

## 2021-10-15 NOTE — PLAN OF CARE
Problem: Altered Mood, Psychotic Behavior:  Goal: Able to demonstrate trust by eating, participating in treatment and following staff's direction  Description: Able to demonstrate trust by eating, participating in treatment and following staff's direction  Outcome: Ongoing  Note: Patient eating meals, taking scheduled medications, following staff's direction. Problem: Altered Mood, Psychotic Behavior:  Goal: Able to verbalize reality based thinking  Description: Able to verbalize reality based thinking  Outcome: Ongoing  Note: Patient alert and oriented x 4, paranoid and suspicious about staff. Problem: Altered Mood, Psychotic Behavior:  Goal: Ability to achieve adequate nutritional intake will improve  Description: Ability to achieve adequate nutritional intake will improve  Outcome: Ongoing  Note: Patient eating 10% of meals today. Encourage patient to drink supplements for increase calorie intake. Problem: Altered Mood, Psychotic Behavior:  Goal: Ability to interact with others will improve  Description: Ability to interact with others will improve  Outcome: Ongoing  Note: Patient interacts some with peers, tearful this am while talking with staff. Problem: Altered Mood, Psychotic Behavior:  Goal: Compliance with prescribed medication regimen will improve  Description: Compliance with prescribed medication regimen will improve  Outcome: Ongoing  Note: Patient taking scheduled medications. Problem: Altered Mood, Psychotic Behavior:  Goal: Patient specific goal  Description: Patient specific goal  Outcome: Ongoing  Note: Patient reports goal for today is to \"stop worrying, make plans to go home\". Patient continues to work towards goal.;       Problem: Altered Mood, Psychotic Behavior:  Goal: Verbalizes reality of situation/illness  Outcome: Ongoing  Note: Patient does not verbalize the reality her illness.      Problem: Discharge Planning:  Goal: Discharged to appropriate level of care  Description: Discharged to appropriate level of care  Note: Patient voices no needs before discharge. Patient plans to be discharged to home with . Discharge planner working with patient to achieve optimal discharge plans, specific to individual needs. Problem: Anxiety:  Goal: Level of anxiety will decrease  Description: Level of anxiety will decrease  Outcome: Ongoing  Note: Patient reports anxiety. Patient refuses to take PRN Atarax. Problem: Pain:  Goal: Pain level will decrease  Description: Pain level will decrease  Outcome: Ongoing  Note: Pain Assessment: 0-10  Pain Level: 10   Patient's Stated Pain Goal: 1   Is pain goal met at this time? NA   Patient reports generalized pain, refuses to take PRN pain medications, states  \"the [employer] sits on my shoulders  Non-Pharmaceutical Pain Intervention(s): Declines       Problem: Falls - Risk of:  Goal: Will remain free from falls  Description: Will remain free from falls  Outcome: Ongoing  Note: No falls were observed or reported so far this shift, has limb when ambulating and wears non-skid slippers socks. Encourage patient to wear shower shoes while in the shower. Remains on fall precautions. Problem: Mobility - Impaired:  Goal: Mobility will improve  Description: Mobility will improve  Outcome: Ongoing  Note: Patient ambulates with a limb, no assistance needed. Care plan reviewed with patient. Patient verbalize understanding of the plan of care and contribute to goal setting.

## 2021-10-16 PROCEDURE — 1240000000 HC EMOTIONAL WELLNESS R&B

## 2021-10-16 PROCEDURE — 99231 SBSQ HOSP IP/OBS SF/LOW 25: CPT | Performed by: NURSE PRACTITIONER

## 2021-10-16 PROCEDURE — 6370000000 HC RX 637 (ALT 250 FOR IP): Performed by: PHYSICIAN ASSISTANT

## 2021-10-16 RX ADMIN — ARIPIPRAZOLE 15 MG: 15 TABLET ORAL at 07:56

## 2021-10-16 ASSESSMENT — PAIN DESCRIPTION - LOCATION
LOCATION: GENERALIZED
LOCATION: GENERALIZED

## 2021-10-16 ASSESSMENT — PAIN DESCRIPTION - DESCRIPTORS: DESCRIPTORS: ACHING

## 2021-10-16 ASSESSMENT — PAIN DESCRIPTION - PAIN TYPE
TYPE: CHRONIC PAIN
TYPE: CHRONIC PAIN

## 2021-10-16 ASSESSMENT — PAIN SCALES - GENERAL
PAINLEVEL_OUTOF10: 9
PAINLEVEL_OUTOF10: 10

## 2021-10-16 ASSESSMENT — PAIN - FUNCTIONAL ASSESSMENT: PAIN_FUNCTIONAL_ASSESSMENT: ACTIVITIES ARE NOT PREVENTED

## 2021-10-16 ASSESSMENT — PAIN DESCRIPTION - ONSET: ONSET: ON-GOING

## 2021-10-16 ASSESSMENT — PAIN DESCRIPTION - PROGRESSION: CLINICAL_PROGRESSION: NOT CHANGED

## 2021-10-16 ASSESSMENT — PAIN DESCRIPTION - ORIENTATION: ORIENTATION: OTHER (COMMENT)

## 2021-10-16 ASSESSMENT — PAIN DESCRIPTION - FREQUENCY: FREQUENCY: CONTINUOUS

## 2021-10-16 NOTE — BH NOTE
Group Therapy Note    Date: 10/15/2021  Start Time: 2000  End Time:  2020  Number of Participants: 1    Type of Group: Wrap-Up/Relaxation    Wellness Binder Information  Module Name:  None  Session Number:  None    Patient's Goal:  To not worry    Notes:  Ongoing    Status After Intervention:  Improved    Participation Level: Interactive    Participation Quality: Attentive      Speech:  hesitant      Thought Process/Content: Logical      Affective Functioning: Congruent      Mood: anxious      Level of consciousness:  Alert      Response to Learning: Able to retain information      Endings: None Reported    Modes of Intervention: Education      Discipline Responsible: Registered Nurse      Signature:   Marly Verma RN

## 2021-10-16 NOTE — PLAN OF CARE
Problem: Altered Mood, Psychotic Behavior:  Goal: Able to demonstrate trust by eating, participating in treatment and following staff's direction  Description: Able to demonstrate trust by eating, participating in treatment and following staff's direction  Outcome: Ongoing  Note: Patient eating meals, taking medications as prescribed, and following staff direction. Problem: Altered Mood, Psychotic Behavior:  Goal: Able to verbalize reality based thinking  Description: Able to verbalize reality based thinking  Outcome: Ongoing  Note: Patient's reality based thinking improving. Problem: Altered Mood, Psychotic Behavior:  Goal: Ability to achieve adequate nutritional intake will improve  Description: Ability to achieve adequate nutritional intake will improve  Outcome: Ongoing  Note: Patient consuming % of all meals this shift. Problem: Altered Mood, Psychotic Behavior:  Goal: Ability to interact with others will improve  Description: Ability to interact with others will improve  Note: Patient out on the unit and attending groups. Problem: Altered Mood, Psychotic Behavior:  Goal: Compliance with prescribed medication regimen will improve  Description: Compliance with prescribed medication regimen will improve  Outcome: Ongoing  Note: Patient took medications as prescribed today. Problem: Altered Mood, Psychotic Behavior:  Goal: Patient specific goal  Description: Patient specific goal  Outcome: Ongoing  Note: Patient stated her goal for the day is to \"try to trust\". Problem: Altered Mood, Psychotic Behavior:  Goal: Verbalizes reality of situation/illness  Outcome: Ongoing  Note: Patient Alert and oriented to person, place, and time. Disoriented to situation. Problem: Discharge Planning:  Goal: Discharged to appropriate level of care  Description: Discharged to appropriate level of care  Outcome: Ongoing  Note: Discharge planning ongoing at this time.       Problem:

## 2021-10-16 NOTE — PLAN OF CARE
Problem: Altered Mood, Psychotic Behavior:  Goal: Able to demonstrate trust by eating, participating in treatment and following staff's direction  Description: Able to demonstrate trust by eating, participating in treatment and following staff's direction  10/15/2021 2211 by Kurtis Black RN  Outcome: Ongoing  Note: Patient took 100% of an evening snack, participated in goal wrap and was cooperative with staff's requests. 10/15/2021 1417 by Mara Workman RN  Outcome: Ongoing  Note: Patient eating meals, taking scheduled medications, following staff's direction. Goal: Able to verbalize reality based thinking  Description: Able to verbalize reality based thinking  10/15/2021 2211 by Kurtis Black RN  Outcome: Ongoing  Note: Patient is oriented to person, place and time but limited as to situation. 10/15/2021 1417 by Mara Workman RN  Outcome: Ongoing  Note: Patient alert and oriented x 4, paranoid and suspicious about staff. Goal: Ability to achieve adequate nutritional intake will improve  Description: Ability to achieve adequate nutritional intake will improve  10/15/2021 2211 by Kurtis Black RN  Outcome: Ongoing  Note: Patient noted taking food and fluids well. 10/15/2021 1417 by Mara Workman RN  Outcome: Ongoing  Note: Patient eating 10% of meals today. Encourage patient to drink supplements for increase calorie intake. Goal: Ability to interact with others will improve  Description: Ability to interact with others will improve  10/15/2021 2211 by Kurtis Black RN  Outcome: Ongoing  Note: Patient noted with improved interaction with staff and peers. 10/15/2021 1417 by Mara Workman RN  Outcome: Ongoing  Note: Patient interacts some with peers, tearful this am while talking with staff.   Goal: Compliance with prescribed medication regimen will improve  Description: Compliance with prescribed medication regimen will improve  10/15/2021 2211 by Kurtis Black a 10. Patient denied any need for medication. 10/15/2021 1417 by Jodie Schaefer RN  Outcome: Ongoing  Note: Pain Assessment: 0-10  Pain Level: 10   Patient's Stated Pain Goal: 1   Is pain goal met at this time? NA   Patient reports generalized pain, refuses to take PRN pain medications, states  \"the [employer] sits on my shoulders  Non-Pharmaceutical Pain Intervention(s): Declines       Problem: Falls - Risk of:  Goal: Will remain free from falls  Description: Will remain free from falls  10/15/2021 2211 by Rodrigo Connor RN  Outcome: Ongoing  Note: No falls noted so far this shift. Gait steady with ambulation. 10/15/2021 1417 by Jodie Schaefer RN  Outcome: Ongoing  Note: No falls were observed or reported so far this shift, has limb when ambulating and wears non-skid slippers socks. Encourage patient to wear shower shoes while in the shower. Remains on fall precautions. Problem: Mobility - Impaired:  Goal: Mobility will improve  Description: Mobility will improve  10/15/2021 2211 by Rodrigo Connor RN  Outcome: Ongoing  Note: No mobility issues noted at present. 10/15/2021 1417 by Jodie Schaefer RN  Outcome: Ongoing  Note: Patient ambulates with a limb, no assistance needed. Problem: Coping:  Goal: Ability to identify problematic behaviors that deter socialization will improve  Description: Ability to identify problematic behaviors that deter socialization will improve  10/15/2021 2211 by Rodrigo Connor RN  Outcome: Ongoing  Note: Ongoing. 10/15/2021 1421 by Forestine Lundborg  Outcome: Met This Shift  10/15/2021 1421 by Forestine Lundborg  Outcome: Met This Shift   Care plan reviewed with patient.   Patient does not verbalize understanding of the plan of care and does contribute to goal setting

## 2021-10-16 NOTE — PROGRESS NOTES
Psychiatry Progress Note      10-    CC: Bipolar I disorder, most recent episode depressed with mood-congruent psychotic features ([facility] [address]                   Subjective    Progress:  Aren Lazaro admits to having paranoia. Reports society is after her. Denies AH/VH. Reports having some mood swings. And will received increased Abilify dose today. Reports Abilify does help. Denies having side effects. Good med compliance is verified. Reports appetite is good and slept well last night. Verified slept 7.5 hours and woke only once. States she attended all groups yesterday. Reports  visited yesterday and remains supportive. Madelaine Yuan denies feelings of harm towards self or others. Reports depression is a little better. Objective  /79   Pulse 96   Temp 97.9 °F (36.6 °C) (Tympanic)   Resp 16   Ht 5' 4\" (1.626 m)   Wt 125 lb (56.7 kg)   SpO2 97%   BMI 21.46 kg/m²      MSE:  Level of consciousness: Alert  Appearance: hospital attire, in chair and fair grooming   Behavior/Motor: Calm   Attitude toward examiner: cooperative   Speech: low volume. Circumstantial   Mood: Euthymic  Affect: Blunt  Thought processes: Circumstantial   Suicidal Ideation: Denies suicidal ideations  Homicidal ideation: Denies homicidal ideations  Delusions: Paranoid  Perceptual Disturbance: Denies AH/VH  Cognition: Oriented to person, place, time   Concentration fair   Memory intact   Insight: Limited  Judgment: Limited    Assessment:  Bipolar I disorder, most recent episode depressed with [facility]   Rule out schizoaffective disorder    Plan:  Continue current meds as ordered  Continue to encourage group attendance.       Stephanie Arceo, CNP  23-

## 2021-10-16 NOTE — BH NOTE
Group Therapy Note    Date: 10/16/2021  Start Time: 8225  End Time:  1737  Number of Participants: 9    Type of Group: Psychoeducation    Group Goal:  Define Self Esteem, where it comes from, who or what affects it, can it change and why it is important. We played a game of self talk to increase insight to same. We made list of Self esteem Boosters versus Busters. Each client was asked to give one self positive trait and rate their self esteem on scale of #1-10 with 10 the most self esteem. One positive self trait:  \"I am good, honest and trustworth\"   Self Esteem #4-5    Notes:  Gave praise for coming and sharing. We listened to song called \"Fear is a Liar\" by Cami Hernandez today to end group. Status After Intervention:  Improved    Participation Level:  Active Listener    Participation Quality: Appropriate and Attentive      Speech:  normal      Thought Process/Content: Logical      Affective Functioning: Congruent, Blunted, Flat and Constricted/Restricted      Mood: depressed and tearful at times      Level of consciousness:  Alert, Oriented x4 and Attentive      Response to Learning: Able to verbalize current knowledge/experience, Able to verbalize/acknowledge new learning, Able to retain information, Capable of insight and Able to change behavior      Endings: None Reported    Modes of Intervention: Education, Support, Socialization and Activity and Media      Discipline Responsible: Registered Nurse      Signature:  RAFFAELE Lorenzana, PRIYANK MSN

## 2021-10-17 PROCEDURE — 99231 SBSQ HOSP IP/OBS SF/LOW 25: CPT | Performed by: NURSE PRACTITIONER

## 2021-10-17 PROCEDURE — 1240000000 HC EMOTIONAL WELLNESS R&B

## 2021-10-17 PROCEDURE — 6370000000 HC RX 637 (ALT 250 FOR IP): Performed by: PHYSICIAN ASSISTANT

## 2021-10-17 RX ADMIN — ARIPIPRAZOLE 15 MG: 15 TABLET ORAL at 07:45

## 2021-10-17 ASSESSMENT — PAIN DESCRIPTION - DESCRIPTORS
DESCRIPTORS: ACHING
DESCRIPTORS: ACHING

## 2021-10-17 ASSESSMENT — PAIN SCALES - GENERAL
PAINLEVEL_OUTOF10: 9
PAINLEVEL_OUTOF10: 10

## 2021-10-17 ASSESSMENT — PAIN DESCRIPTION - ORIENTATION: ORIENTATION: OTHER (COMMENT)

## 2021-10-17 ASSESSMENT — PAIN DESCRIPTION - FREQUENCY
FREQUENCY: CONTINUOUS
FREQUENCY: CONTINUOUS

## 2021-10-17 ASSESSMENT — PAIN DESCRIPTION - ONSET
ONSET: ON-GOING
ONSET: ON-GOING

## 2021-10-17 ASSESSMENT — PAIN DESCRIPTION - PAIN TYPE
TYPE: CHRONIC PAIN
TYPE: CHRONIC PAIN

## 2021-10-17 ASSESSMENT — PAIN DESCRIPTION - PROGRESSION
CLINICAL_PROGRESSION: NOT CHANGED
CLINICAL_PROGRESSION: NOT CHANGED

## 2021-10-17 ASSESSMENT — PAIN DESCRIPTION - LOCATION
LOCATION: GENERALIZED
LOCATION: GENERALIZED

## 2021-10-17 NOTE — PLAN OF CARE
Problem: Altered Mood, Psychotic Behavior:  Goal: Able to demonstrate trust by eating, participating in treatment and following staff's direction  Description: Able to demonstrate trust by eating, participating in treatment and following staff's direction  10/17/2021 0052 by Nai Pollard RN  Outcome: Ongoing  Note: Patient took 100% of an evening snack, attended group and was cooperative with staff's requests. 10/16/2021 1404 by Sean Chase RN  Outcome: Ongoing  Note: Patient eating meals, taking medications as prescribed, and following staff direction. Goal: Able to verbalize reality based thinking  Description: Able to verbalize reality based thinking  10/17/2021 0052 by Nai Pollard RN  Outcome: Ongoing  Note: Patient is oriented to person, place and time but is limited as to situation. 10/16/2021 1404 by Sean Chase RN  Outcome: Ongoing  Note: Patient's reality based thinking improving. Goal: Ability to achieve adequate nutritional intake will improve  Description: Ability to achieve adequate nutritional intake will improve  10/17/2021 0052 by Nai Pollard RN  Outcome: Ongoing  Note: Patient noted taking food and fluids well. 10/16/2021 1404 by Sean Chase RN  Outcome: Ongoing  Note: Patient consuming % of all meals this shift. Goal: Ability to interact with others will improve  Description: Ability to interact with others will improve  10/17/2021 0052 by Nai Pollard RN  Outcome: Ongoing  Note: Patient noted with fair interaction with staff and peers. 10/16/2021 1404 by Sean Chase RN  Note: Patient out on the unit and attending groups. Goal: Compliance with prescribed medication regimen will improve  Description: Compliance with prescribed medication regimen will improve  10/17/2021 0052 by Nai Pollard RN  Outcome: Ongoing  Note: No medications were required at bedtime.   10/16/2021 1404 by Sean Chase RN  Outcome: Ongoing  Note: Patient took medications as prescribed today. Goal: Patient specific goal  Description: Patient specific goal  10/17/2021 0052 by Nai Pollard RN  Outcome: Ongoing  Note: Patient stated her goal is to be more trusting. Patient continues to work on this goal.  10/16/2021 1404 by Sean Chase RN  Outcome: Ongoing  Note: Patient stated her goal for the day is to \"try to trust\". Goal: Verbalizes reality of situation/illness  10/17/2021 0052 by Nai Pollard RN  Outcome: Ongoing  Note: Patient verbalizes limited understanding of current condition. 10/16/2021 1404 by Sean Chase RN  Outcome: Ongoing  Note: Patient Alert and oriented to person, place, and time. Disoriented to situation. Problem: Discharge Planning:  Goal: Discharged to appropriate level of care  Description: Discharged to appropriate level of care  10/17/2021 0052 by Nai Pollard RN  Outcome: Ongoing  Note: Patient states she plans to return home with her  at discharge and follow up with [facility]. 10/16/2021 1404 by Sean Chase RN  Outcome: Ongoing  Note: Discharge planning ongoing at this time. Problem: Anxiety:  Goal: Level of anxiety will decrease  Description: Level of anxiety will decrease  10/17/2021 0052 by Nai Pollard RN  Outcome: Ongoing  Note: Patient states she continues with some anxiety at intervals. Patient denied the need for medication. 10/16/2021 1404 by Sean Chase RN  Outcome: Ongoing  Note: Patient denies anxiety this shift. Problem: Pain:  Goal: Pain level will decrease  Description: Pain level will decrease  10/17/2021 0052 by Nai Pollard RN  Outcome: Ongoing  Note: Patient states current generalized aching of a 9. Patient declined need for medication. 10/16/2021 1404 by Sean Chase RN  Outcome: Ongoing  Note: Patient states pain is 10/10 on a 0-10 scale. Patient refusing pain medications or additional interventions at this time.       Problem: Falls - Risk of:  Goal: Will remain free from falls  Description: Will remain free from falls  10/17/2021 0052 by Adrienne Lee RN  Outcome: Ongoing  Note: No falls noted so far this shift. Gait steady with ambulation. 10/16/2021 1404 by Sasha Feliz RN  Outcome: Ongoing  Note: Patient free form falls so far this shift. Problem: Mobility - Impaired:  Goal: Mobility will improve  Description: Mobility will improve  10/17/2021 0052 by Adrienne Lee RN  Outcome: Ongoing  Note: Ongoing. 10/16/2021 1404 by Sasha Feliz RN  Outcome: Ongoing  Note: ongoing     Problem: Coping:  Goal: Ability to identify problematic behaviors that deter socialization will improve  Description: Ability to identify problematic behaviors that deter socialization will improve  10/17/2021 0052 by Adrienne Lee RN  Outcome: Ongoing  Note: Ongoing. 10/16/2021 1404 by Sasha Feliz RN  Outcome: Ongoing  Note: Patient out on the unit and attending groups. Care plan reviewed with patient.   Patient does not verbalize understanding of the plan of care and does contribute to goal setting

## 2021-10-17 NOTE — BH NOTE
Group Therapy Note    Date: 10/16/2021  Start Time: 2000  End Time:  2020  Number of Participants: 1    Type of Group: Wrap-Up/Relaxation    Wellness Binder Information  Module Name:  None  Session Number:  None    Patient's Goal:  To try to trust more    Notes:  Ongoing    Status After Intervention:  Unchanged    Participation Level: Interactive    Participation Quality: Attentive      Speech:  hesitant      Thought Process/Content: Delusional      Affective Functioning: Blunted      Mood: anxious and depressed      Level of consciousness:  Alert      Response to Learning: Able to retain information      Endings: None Reported    Modes of Intervention: Education      Discipline Responsible: Registered Nurse      Signature:   Dale Guerrero RN

## 2021-10-17 NOTE — PLAN OF CARE
Problem: Altered Mood, Psychotic Behavior:  Goal: Able to demonstrate trust by eating, participating in treatment and following staff's direction  Description: Able to demonstrate trust by eating, participating in treatment and following staff's direction  10/17/2021 0824 by Linda Shetty RN  Outcome: Ongoing  Note: Patient ate % of all meals, attended groups, and followed staff direction. Problem: Altered Mood, Psychotic Behavior:  Goal: Able to verbalize reality based thinking  Description: Able to verbalize reality based thinking  10/17/2021 0824 by Linda Shetty RN  Outcome: Ongoing  Note: Patient oriented to person, place, and time. Disoriented to situation. Problem: Altered Mood, Psychotic Behavior:  Goal: Ability to achieve adequate nutritional intake will improve  Description: Ability to achieve adequate nutritional intake will improve  10/17/2021 0824 by Linda Shetyt RN  Outcome: Ongoing  Note: Patient taking food and fluids well. Problem: Altered Mood, Psychotic Behavior:  Goal: Ability to interact with others will improve  Description: Ability to interact with others will improve  10/17/2021 0824 by Linda Shetty RN  Outcome: Ongoing  Note: Patient participating in groups and interacting with patients on the unit. Problem: Altered Mood, Psychotic Behavior:  Goal: Compliance with prescribed medication regimen will improve  Description: Compliance with prescribed medication regimen will improve  10/17/2021 0824 by Linda Shetty RN  Outcome: Ongoing  Note: Patient took medications ad prescribed.       Problem: Altered Mood, Psychotic Behavior:  Goal: Patient specific goal  Description: Patient specific goal  10/17/2021 0824 by Linda Shetty RN  Outcome: Ongoing  Note: \"to focus on concentration\"     Problem: Altered Mood, Psychotic Behavior:  Goal: Verbalizes reality of situation/illness  10/17/2021 0824 by Linda Shetty RN  Outcome: Ongoing  Note: Patient verbalizes minimal understanding of the current condition. Problem: Discharge Planning:  Goal: Discharged to appropriate level of care  Description: Discharged to appropriate level of care  10/17/2021 0824 by Sanjeev You RN  Outcome: Ongoing  Note: Patient plans to be discharged home with her  and follow up with [facility]. Problem: Anxiety:  Goal: Level of anxiety will decrease  Description: Level of anxiety will decrease  10/17/2021 0824 by Sanjeev You RN  Outcome: Ongoing  Note: Patient states Eletha Staff is low today between  1-2. \"     Problem: Pain:  Goal: Pain level will decrease  Description: Pain level will decrease  10/17/2021 0824 by Sanjeev You RN  Outcome: Ongoing  Note: Patient states generalized pain of a 10, patient refusing pain medications at this time. Problem: Falls - Risk of:  Goal: Will remain free from falls  Description: Will remain free from falls  10/17/2021 0824 by Sanjeev You RN  Outcome: Ongoing  Note: No falls noted so far this shift. Problem: Mobility - Impaired:  Goal: Mobility will improve  Description: Mobility will improve  10/17/2021 0824 by Sanjeev You RN  Note: Patient has steady gait and is free from falls so far this shift. Problem: Coping:  Goal: Ability to identify problematic behaviors that deter socialization will improve  Description: Ability to identify problematic behaviors that deter socialization will improve  10/17/2021 0824 by Sanjeev You RN  Outcome: Ongoing  Note: Patient attending groups and out on the unit. Care plan reviewed with patient. Patient does verbalize understanding of the plan of care and does contribute to goal setting.

## 2021-10-17 NOTE — PROGRESS NOTES
Group Therapy Note     Date: 10/17/2021  Start Time: 1500  End Time:  7885  Number of Participants: 7     Type of Group: Healthy Living/Wellness        Patient's Goal:  Focus on concentrating      Notes:  Group on coping and self talk for stress control     Status After Intervention:  Unchanged     Participation Level: Active Listener and Interactive     Participation Quality: Attentive and Sharing        Speech:  normal        Thought Process/Content: Logical        Affective Functioning: Congruent        Mood: Stable        Level of consciousness:  Alert and Oriented x4        Response to Learning: Able to verbalize current knowledge/experience        Endings: None Reported     Modes of Intervention: Education        Discipline Responsible: Licensed Practical Nurse     Signature: Vianey Duffy.  [facility] LPN

## 2021-10-17 NOTE — PROGRESS NOTES
Psychiatry Progress Note                                                  10-     CC: Bipolar I disorder, most recent episode depressed with mood-congruent psychotic features ([facility]                                                                          Subjective     Progress:  Leoncio Arreguin still admits to having paranoia but reports this is less. . Reports today  is not sure if society is after her. . Denies AH/VH. Reports mood swings are a little better. Reports feeling a lot better on the Abilify. . Denies having side effects. Good med compliance is verified. Reports appetite is good and slept well again  last night. Verified slept 7.5 hours continuous. States she attended all groups yesterday. Reports son visited yesterday and reports visit went well. Reports talked with  on phone yesterday. Leoncio Arreguin denies feelings of harm towards self or others. Reports depression is about the same.      Objective  /77   Pulse 89   Temp 97.9 °F (36.6 °C) (Oral)   Resp 18   Ht 5' 4\" (1.626 m)   Wt 125 lb (56.7 kg)   SpO2 99%   BMI 21.46 kg/m²       MSE:  Level of consciousness: Alert  Appearance: hospital attire, in chair and fair grooming   Behavior/Motor: Appears somewhat guarded. Attitude toward examiner: cooperative   Speech: low volume.  Circumstantial   Mood: Dysthymic  Affect: Blunt  Thought processes: Circumstantial   Suicidal Ideation: Denies suicidal ideations  Homicidal ideation: Denies homicidal ideations  Delusions: Paranoid  Perceptual Disturbance: Denies AH/VH  Cognition: Oriented to person, place, time   Concentration fair   Memory intact   Insight: Limited  Judgment: Limited     Assessment:  Bipolar I disorder, most recent episode depressed with mood-congruent psychotic features (HCC)   Rule out schizoaffective disorder     Plan:  Continue current meds as ordered  Continue to encourage group attendance.        Roberto Carlos Crowe, CNP  77-

## 2021-10-17 NOTE — SUICIDE SAFETY PLAN
SAFETY PLAN    A suicide Safety Plan is a document that supports someone when they are having thoughts of suicide. Warning Signs that indicate a suicidal crisis may be developing: What (situations, thoughts, feelings, body sensations, behaviors, etc.) do you experience that lets you know you are beginning to think about suicide? 1. Bad thoughts  2. plan  3. attempt    Internal Coping Strategies:  What things can I do (relaxation techniques, hobbies, physical activities, etc.) to take my mind off my problems without contacting another person? 1. sew  2. read  3. walk    People and social settings that provide distraction: Who can I call or where can I go to distract me? 1. Name: park    2. Name: Rosita Rush     3. Place: friend            4. Place: walk    People whom I can ask for help: Who can I call when I need help - for example, friends, family, clergy, someone else? 1. Name: God                  2. Name: Faith    3. Name: friends      Professionals or [address] [place], [place] [address]vd I can contact during a crisis: Who can I call for help - for example, my doctor, my psychiatrist, my psychologist, a mental health provider, a suicide hotline? 1. Clinician Name:     Phone: [phone]      3. Suicide Prevention Lifeline: 8-419-462-TALK (2498)    4. [address] [place] [address] Emergency Services -  for example, [facility], [facility] suicide hotline, [facility] Hotline: see attached          Making the environment safe: How can I make my environment (house/apartment/living space) safer? For example, can I remove guns, medications, and other items?   1. Med.  2. Rugs, knife

## 2021-10-18 PROCEDURE — APPSS30 APP SPLIT SHARED TIME 16-30 MINUTES: Performed by: PHYSICIAN ASSISTANT

## 2021-10-18 PROCEDURE — 1240000000 HC EMOTIONAL WELLNESS R&B

## 2021-10-18 PROCEDURE — 6370000000 HC RX 637 (ALT 250 FOR IP): Performed by: PHYSICIAN ASSISTANT

## 2021-10-18 PROCEDURE — 99231 SBSQ HOSP IP/OBS SF/LOW 25: CPT | Performed by: PSYCHIATRY & NEUROLOGY

## 2021-10-18 RX ADMIN — ARIPIPRAZOLE 15 MG: 15 TABLET ORAL at 08:59

## 2021-10-18 RX ADMIN — GLATIRAMER 40 MG: 40 INJECTION, SOLUTION SUBCUTANEOUS at 08:59

## 2021-10-18 ASSESSMENT — PAIN DESCRIPTION - DESCRIPTORS
DESCRIPTORS: BURNING
DESCRIPTORS: ACHING

## 2021-10-18 ASSESSMENT — PAIN DESCRIPTION - ONSET
ONSET: ON-GOING
ONSET: ON-GOING

## 2021-10-18 ASSESSMENT — PAIN DESCRIPTION - FREQUENCY
FREQUENCY: CONTINUOUS
FREQUENCY: CONTINUOUS

## 2021-10-18 ASSESSMENT — PAIN SCALES - GENERAL
PAINLEVEL_OUTOF10: 9
PAINLEVEL_OUTOF10: 10

## 2021-10-18 ASSESSMENT — PAIN DESCRIPTION - PAIN TYPE
TYPE: CHRONIC PAIN
TYPE: CHRONIC PAIN

## 2021-10-18 ASSESSMENT — PAIN DESCRIPTION - LOCATION
LOCATION: BACK;GENERALIZED
LOCATION: GENERALIZED

## 2021-10-18 ASSESSMENT — PAIN DESCRIPTION - PROGRESSION
CLINICAL_PROGRESSION: NOT CHANGED
CLINICAL_PROGRESSION: NOT CHANGED

## 2021-10-18 ASSESSMENT — PAIN DESCRIPTION - ORIENTATION: ORIENTATION: OTHER (COMMENT)

## 2021-10-18 NOTE — PLAN OF CARE
Problem: Altered Mood, Psychotic Behavior:  Goal: Able to demonstrate trust by eating, participating in treatment and following staff's direction  Description: Able to demonstrate trust by eating, participating in treatment and following staff's direction  Outcome: Ongoing  Note: Patient took 100% of an evening snack, attended group and was cooperative with staff's requests. Goal: Able to verbalize reality based thinking  Description: Able to verbalize reality based thinking  Outcome: Ongoing  Note: Patient is oriented to person, place and time but is limited as to situation. Goal: Ability to achieve adequate nutritional intake will improve  Description: Ability to achieve adequate nutritional intake will improve  Outcome: Ongoing  Note: Patient noted taking food and fluids well. Goal: Ability to interact with others will improve  Description: Ability to interact with others will improve  Outcome: Ongoing  Note: Patient patient noted with good staff and fair peer interaction this shift. Goal: Compliance with prescribed medication regimen will improve  Description: Compliance with prescribed medication regimen will improve  Outcome: Ongoing  Note: No medications were scheduled at bedtime. Goal: Patient specific goal  Description: Patient specific goal  Outcome: Ongoing  Note: Patient stated her goal was to focus on concentration. Patient continues to work on this goal.  Goal: Verbalizes reality of situation/illness  Outcome: Ongoing  Note: Patient verbalizes fair understanding of her current situation. Problem: Discharge Planning:  Goal: Discharged to appropriate level of care  Description: Discharged to appropriate level of care  Outcome: Ongoing  Note: Patient states she plans to return home with her  at discharge and follow up with [facility].      Problem: Anxiety:  Goal: Level of anxiety will decrease  Description: Level of anxiety will decrease  Outcome: Ongoing  Note: Patient states she continues with some anxiety at intervals. Problem: Pain:  Goal: Pain level will decrease  Description: Pain level will decrease  Outcome: Ongoing  Note: Patient states she continues with chronic generalized aching of a 9. Problem: Falls - Risk of:  Goal: Will remain free from falls  Description: Will remain free from falls  Outcome: Ongoing  Note: No falls noted so far this shift. Gait steady with ambulation. Problem: Mobility - Impaired:  Goal: Mobility will improve  Description: Mobility will improve  Outcome: Ongoing  Note: Ongoing. Problem: Coping:  Goal: Ability to identify problematic behaviors that deter socialization will improve  Description: Ability to identify problematic behaviors that deter socialization will improve  Outcome: Ongoing  Note: Ongoing. Care plan reviewed with patient.   Patient does verbalize understanding of the plan of care and does contribute to goal setting

## 2021-10-18 NOTE — GROUP NOTE
Group Therapy Note    Date: 10/18/2021    Group Start Time: 1335  Group End Time: 4068  Group Topic: Psychotherapy    [facility] 4E    ZENAIDA Alford        Group Therapy Note    Attendees: 4         Patient's Goal:  Discuss support and coping skills with peers and the impact this has had on patient mental health experience. Notes:  Patient contributed to group conversation in a positive manner. Patient identifies prayer and Catholic as a large part of her life and feels they are most beneficial to her. Status After Intervention:  Improved    Participation Level:  Active Listener    Participation Quality: Appropriate, Sharing and Supportive      Speech:  normal      Thought Process/Content: Logical      Affective Functioning: Congruent      Mood: euthymic      Level of consciousness:  Alert, Oriented x4 and Attentive      Response to Learning: Able to verbalize current knowledge/experience, Able to verbalize/acknowledge new learning, Able to retain information, Capable of insight, Able to change behavior and Progressing to goal      Endings: None Reported    Modes of Intervention: Support and Socialization      Discipline Responsible: /Counselor      Signature:  ZENAIDA Alford

## 2021-10-18 NOTE — BH NOTE
Group Therapy Note    Date: 10/17/2021  Start Time: 1945  End Time:   2000  Number of Participants: 1    Type of Group: Wrap-Up/Relaxation    Wellness Binder Information  Module Name:  None  Session Number:  None    Patient's Goal:  To focus on concentration    Notes:  Ongoing    Status After Intervention:  Improved    Participation Level: Interactive    Participation Quality: Appropriate      Speech:  normal      Thought Process/Content: Logical      Affective Functioning: Blunted      Mood: anxious      Level of consciousness:  Alert      Response to Learning: Able to retain information      Endings: None Reported    Modes of Intervention: Education      Discipline Responsible: Registered Nurse      Signature:   Nai Pollard RN

## 2021-10-18 NOTE — BH NOTE
Group Therapy Note    Date: 10/18/2021     Start Time:  1000  End Time:   1030    Number of Participants: 8    Type of Group: Recreational/Social    Patient's Goal:  Increase socialization. Notes:    Patient participated in a drawing activity and demonstrated good concentration. Patient was social with others at times.      Status After Intervention:  Improved    Participation Level: Interactive    Participation Quality: Appropriate and Attentive      Speech:  hesitant      Thought Process/Content: Logical      Affective Functioning: Flat      Mood: depressed      Level of consciousness:  Oriented x4      Response to Learning: Progressing to goal      Endings: None Reported    Modes of Intervention: Socialization, Exploration and Activity      Discipline Responsible: Psychoeducational Specialist      Signature:  Jelena Cabrera

## 2021-10-18 NOTE — BH NOTE
PLAN OF CARE:     Start Time: 0900  End Time:   0930    Group Topic:  Daily Goals    Group Type:   Goal Group    Intervention/Goal:  To increase support and identify daily goals    Attendance:  Attended group     Affect:   Flat    Behavior:  Cooperative but guarded    Response:   Identified a daily goal     Daily Goal:    Work on getting released. Stay positive and work on strategy for discharge.      Progress:   Progressing to goal

## 2021-10-18 NOTE — PLAN OF CARE
Problem: Altered Mood, Psychotic Behavior:  Goal: Able to demonstrate trust by eating, participating in treatment and following staff's direction  Description: Able to demonstrate trust by eating, participating in treatment and following staff's direction  Outcome: Ongoing  Note: Patient eating meals, taking medications and following staff's direction. Problem: Altered Mood, Psychotic Behavior:  Goal: Able to verbalize reality based thinking  Description: Able to verbalize reality based thinking  Outcome: Ongoing  Note: Patient alert and oriented x 3, reports paranoia is [place] lot better\". Problem: Altered Mood, Psychotic Behavior:  Goal: Ability to achieve adequate nutritional intake will improve  Description: Ability to achieve adequate nutritional intake will improve  Outcome: Ongoing  Note: Patient eating 100% of meals today. Encourage patient to drink supplements for increase calorie intake. Problem: Altered Mood, Psychotic Behavior:  Goal: Ability to interact with others will improve  Description: Ability to interact with others will improve  Outcome: Ongoing  Note: Patient has fair interaction with peers. Problem: Altered Mood, Psychotic Behavior:  Goal: Compliance with prescribed medication regimen will improve  Description: Compliance with prescribed medication regimen will improve  Outcome: Ongoing  Note: Patient taking prescribed medications as ordered, declines PRN medications. Problem: Altered Mood, Psychotic Behavior:  Goal: Patient specific goal  Description: Patient specific goal  Outcome: Ongoing  Note: Patient reports goal for today is to [facility] on getting released, stay positive and work on strategy for discharge\". Patient continues to work towards goal.       Problem: Altered Mood, Psychotic Behavior:  Goal: Verbalizes reality of situation/illness  Outcome: Ongoing  Note: Patient alert and oriented x 3, not to situation.      Problem: Discharge Planning:  Goal: Discharged to appropriate level of care  Description: Discharged to appropriate level of care  Outcome: Ongoing  Note: Patient voices no needs before discharge. Patient plans to be discharged to home with . Discharge planner working with patient to achieve optimal discharge plans, specific to individual needs. Problem: Anxiety:  Goal: Level of anxiety will decrease  Description: Level of anxiety will decrease  Outcome: Ongoing  Note: Patient denies feeling anxious so far this shift. Problem: Pain:  Goal: Pain level will decrease  Description: Pain level will decrease  Outcome: Ongoing  Note: Pain Assessment: 0-10  Pain Level: 10   Patient's Stated Pain Goal: 8 (Pt. stated that she always has pain and can deal with it)   Is pain goal met at this time? NA   Patient reports generalized pain, refused to take PRN pain medications. Non-Pharmaceutical Pain Intervention(s): Relaxation techniques, Rest       Problem: Falls - Risk of:  Goal: Will remain free from falls  Description: Will remain free from falls  Outcome: Ongoing  Note: No falls were observed or reported so far this shift, gait steady when ambulating and wears non-skid slippers socks. Encourage patient to wear shower shoes while in the shower. Remains on fall precautions. Problem: Mobility - Impaired:  Goal: Mobility will improve  Description: Mobility will improve  Outcome: Ongoing  Note: Patient ambulates on unit with assistive devices. Care plan reviewed with patient. Patient verbalize understanding of the plan of care and contribute to goal setting.

## 2021-10-18 NOTE — PLAN OF CARE
Patient has attended all of the groups today and has also been out of her room to visit her  and socialize with some of the patients on the unit so she has met her socialization goal for this shift.

## 2021-10-18 NOTE — PROGRESS NOTES
Department of Psychiatry  Progress Note     Chief Complaint:  Bipolar I disorder, most recent episode depressed with mood-congruent psychotic features ([address]     SUBJECTIVE:    PROGRESS:  Zachary Holly is seen seated out in the day room. She is cooperative and agrees to speak with this writer in the interview room. Zachary Holly states she is doing fine today. Reports her mood as excellent. She says she feels better and more calm since starting Abilify. She appears brighter and smiles/laughs occasionally. She reports her weekend was good. She says she has been focusing on her concentration and feels it has leveled her out. She also mentions that she knows she needs to keep herself occupied and use her coping skills following discharge. She asks when she is going to be discharged. She says she wants to get out before her mother's birthday but refuses to tell this writer whent that is. She continues to have some paranoid but it has lessened since admission. She says she does not know if people are out to get her/are against her. She states \"you don't know until you get out. \" She reports she does not know if she feels safe on the unit. When asked what is not making her feel safe, she says she told the staff she was scared last night after the incident on the unit and states the staff didn't do anything about it. She appears less suspicious of this writer today. She denies any hallucinations. She denies feeling depressed today and says she feels \"good and confident. \"  She has been experiencing intermittent anxiety but has been refusing to take Atarax PRN for her anxiety. She has been compliant with her Abilify and denies any side effects. This writer explained the importance of medication compliance to avoid worsening of psychiatric symptoms following discharge. Patient verbalized understanding. She reports she was slept pretty decent last night. Staff reported she slept 7.5 hours continuous last night.  She has been eating good on the unit. She has been out on the unit interacting with peers and attending groups. She reports she has been talking on the phone with her  and son. Her  and son visited her over the weekend. She states she is still having generalized pain today    Suicidal ideations: denies    Compliance with medications:  Good- taking Abilify  Medication side effects: absent  ROS: Patient has new complaints:  no  Sleep quality: 7.5 hours continuous last night per staff   Attending groups: yes      OBJECTIVE      Medications  Current Facility-Administered Medications: ARIPiprazole (ABILIFY) tablet 15 mg, 15 mg, Oral, Daily  acetaminophen (TYLENOL) tablet 650 mg, 650 mg, Oral, Q4H PRN  ibuprofen (ADVIL;MOTRIN) tablet 400 mg, 400 mg, Oral, Q6H PRN  magnesium hydroxide (MILK OF MAGNESIA) 400 MG/5ML suspension 30 mL, 30 mL, Oral, Daily PRN  aluminum & magnesium hydroxide-simethicone (MAALOX) 200-200-20 MG/5ML suspension 30 mL, 30 mL, Oral, Q6H PRN  hydrOXYzine (ATARAX) tablet 50 mg, 50 mg, Oral, TID PRN  glatiramer (COPAXONE) injection 40 mg, 40 mg, SubCUTAneous, Once per day on Mon Wed Fri     Physical     height is 5' 4\" (1.626 m) and weight is 125 lb (56.7 kg). Her oral temperature is 97.7 °F (36.5 °C). Her blood pressure is 115/73 and her pulse is 103. Her respiration is 18 and oxygen saturation is 97%.    Lab Results   Component Value Date    WBC 6.7 10/12/2021    HGB 13.8 10/12/2021    HCT 42.1 10/12/2021     10/12/2021    ALT 9 (L) 10/12/2021    AST 12 10/12/2021     (H) 10/12/2021    K 3.6 10/12/2021     10/12/2021    CREATININE 0.6 10/12/2021    BUN 12 10/12/2021    CO2 22 (L) 10/12/2021    TSH 2.400 10/22/2019    INR 0.89 03/13/2019          Mental Status Exam:   Level of consciousness: awake and alert  Appearance:  well-appearing, hospital attire, in chair, good grooming and good hygiene  Behavior/Motor: brightens, occasionally smiles and laughs  Attitude toward examiner: cooperative, attentive  Speech: slow to respond, low volume  Mood:  \"fine\" per patient   Affect:  constricted, brightens   Thought processes: more linear and coherent today   Thought content:  Denies homicidal ideation  Suicidal Ideation:  denies suicidal ideation  Delusions:  paranoid but less  Perceptual Disturbance:  denies any perceptual disturbance  Cognition: Patient is oriented to person, place, time  Concentration: clinically adequate  Memory: impaired   Insight & Judgement: limited       ASSESSMENT     Bipolar I disorder, most recent episode depressed with mood-congruent psychotic features ([address]   Rule out schizoaffective disorder    PLAN    Patient's symptoms show some improvement today  Will continue current medication regimen and observe today   Attempt to develop insight, psycho-education and supportive therapy conducted. Probable discharge: TBD  Follow-up: [facility] outpatient, daily while on inpatient unit     Electronically signed by [facility] Center[facility]LL[facility] CASTORENA on 10/18/2021 at 3:27 PM Reviewed patient's current plan of care and vital signs with nursing staff. Psychiatry Attending Attestation     I independently saw and evaluated the patient. I reviewed the Advance Practice Provider's documentation above. Any additional comments or changes to the Advance Practice Provider's documentation are stated below otherwise agree with assessment. Patient is doing better than before. Notes her mood is better. She is very cheerful today. Continues to have some paranoid thoughts that people are out there trying to get her. Mentions that these are more chronic for her. Reports that she is in a better mood overall and she is very grateful for the help she received. Continues to have some anxiety. Notes that her racing thoughts improved significantly on Abilify. Denies any side effect from the medication.   Possible discharge tomorrow morning if she continues to improve.     Electronically signed by Jose Cam MD on 10/18/21 at 4:28 PM EDT

## 2021-10-19 PROCEDURE — 6370000000 HC RX 637 (ALT 250 FOR IP): Performed by: PHYSICIAN ASSISTANT

## 2021-10-19 PROCEDURE — APPSS30 APP SPLIT SHARED TIME 16-30 MINUTES: Performed by: PHYSICIAN ASSISTANT

## 2021-10-19 PROCEDURE — 1240000000 HC EMOTIONAL WELLNESS R&B

## 2021-10-19 PROCEDURE — 99231 SBSQ HOSP IP/OBS SF/LOW 25: CPT | Performed by: PSYCHIATRY & NEUROLOGY

## 2021-10-19 RX ADMIN — ARIPIPRAZOLE 15 MG: 15 TABLET ORAL at 08:26

## 2021-10-19 ASSESSMENT — PAIN DESCRIPTION - PAIN TYPE
TYPE: CHRONIC PAIN
TYPE: CHRONIC PAIN

## 2021-10-19 ASSESSMENT — PAIN DESCRIPTION - LOCATION
LOCATION: GENERALIZED
LOCATION: GENERALIZED

## 2021-10-19 ASSESSMENT — PAIN DESCRIPTION - DESCRIPTORS
DESCRIPTORS: CONSTANT;ACHING;DISCOMFORT
DESCRIPTORS: ACHING;CONSTANT

## 2021-10-19 ASSESSMENT — PAIN DESCRIPTION - ONSET
ONSET: ON-GOING
ONSET: ON-GOING

## 2021-10-19 ASSESSMENT — PAIN DESCRIPTION - FREQUENCY
FREQUENCY: CONTINUOUS
FREQUENCY: CONTINUOUS

## 2021-10-19 ASSESSMENT — PAIN SCALES - GENERAL
PAINLEVEL_OUTOF10: 10
PAINLEVEL_OUTOF10: 9

## 2021-10-19 ASSESSMENT — PAIN DESCRIPTION - PROGRESSION
CLINICAL_PROGRESSION: NOT CHANGED
CLINICAL_PROGRESSION: NOT CHANGED

## 2021-10-19 ASSESSMENT — PAIN DESCRIPTION - ORIENTATION
ORIENTATION: OTHER (COMMENT)
ORIENTATION: OTHER (COMMENT)

## 2021-10-19 NOTE — PROGRESS NOTES
Department of Psychiatry  Progress Note     Chief Complaint:  Bipolar I disorder, most recent episode depressed with mood-congruent psychotic features ([address]     SUBJECTIVE:    PROGRESS:  Ronald Diana is seen seated out in the day room. She is cooperative and agrees to speak with this writer in the interview room. Ronald Diana states she is doing good today. Reports her mood as good and rates her mood 9 out of 10 with 10 being the best. She appears brighter and more relaxed today. She says she has trying to stay focused and keeping herself busy coloring and reading which has been helping. She is hoping to be discharged today. She reports she is planning on using her coping skills at home and verbalizes that she will reach out to her support system if a few of her coping skills do not work. She continues to endorse some paranoid but it has lessened since admission. She says she is paranoid about \"people not believing in me\" but does not know who is not believing in her. She reports she feels more safe on the unit. When asked what is not making her feel safe, she says other patients yelling and screaming. She was  She appears less suspicious of this writer today. She denies any hallucinations. She denies feeling depressed today. She has been experiencing anxiety about going home but has been refusing to take Atarax PRN for her anxiety. She has been compliant with her Abilify and denies any side effects. She reports she feels this medication is on the \"right path\" and verbalizes that she plans on being compliant with her medication following discharge. She reports she slept as best as she could last night. Staff reported she slept 8 hours continuous last night. She has been eating good on the unit. She has been out on the unit interacting with peers and attending groups. She reports her  and son visited her last night. She states she is still having generalized pain today and rates her pain 10 out of 10.     Sylvia Tello RN informed this writer that the patient's  and father visited her yesterday and verbalized that they did not feel she was doing better. They were questioning Nelson Rahman is the medication going to work? \"   I spoke to the patient's  Rowan Deleon after obtaining patient's written and verbal consent. Rainhilaria Adrienne reported when he visited the patient yesterday, he got some \"bad vibes. \" When asked to elaborate on this, he says the patient still seemed down and paranoid. He then said she appeared drowsy and felt that may be secondary to her medication. He mentioned Kaur Diego might be playing you guys because she wants to come home. \" He said Alayna Salazar was still worried about their son's car and how much the hospital stay was going to cost. Alayna Salazar reportedly told Rowan Deleon it was all his fault that she was admitted. I updated Rowan Deleon on the patient's progress and informed him that Dr. Sherin Márquez, nursing staff and myself have noticed an improvement in the patient's psychiatric symptoms and feel she will be ready for discharge soon. I informed Edwardwero Adrienne that the medication still may need time to work and that her paranoia may not fully resolve by time of discharge but should continue to improve with medication compliance. He verbalized understanding and mentioned that he is okay with Alayna Salazar being discharged tomorrow. He is planning on visiting Alayna Salazar today. I asked him to let nursing staff know if there are any further concerns after visiting with the patient.      Suicidal ideations: denies    Compliance with medications:  Good- taking Abilify  Medication side effects: absent  ROS: Patient has new complaints:  no  Sleep quality: 8 hours continuous last night per staff   Attending groups: yes      OBJECTIVE      Medications  Current Facility-Administered Medications: ARIPiprazole (ABILIFY) tablet 15 mg, 15 mg, Oral, Daily  acetaminophen (TYLENOL) tablet 650 mg, 650 mg, Oral, Q4H PRN  ibuprofen (ADVIL;MOTRIN) tablet 400 mg, 400 mg, Oral, Q6H PRN  magnesium hydroxide (MILK OF MAGNESIA) 400 MG/5ML suspension 30 mL, 30 mL, Oral, Daily PRN  aluminum & magnesium hydroxide-simethicone (MAALOX) 200-200-20 MG/5ML suspension 30 mL, 30 mL, Oral, Q6H PRN  hydrOXYzine (ATARAX) tablet 50 mg, 50 mg, Oral, TID PRN  glatiramer (COPAXONE) injection 40 mg, 40 mg, SubCUTAneous, Once per day on Mon Wed Fri     Physical     height is 5' 4\" (1.626 m) and weight is 125 lb (56.7 kg). Her oral temperature is 98.1 °F (36.7 °C). Her blood pressure is 139/73 and her pulse is 104. Her respiration is 16 and oxygen saturation is 96%.    Lab Results   Component Value Date    WBC 6.7 10/12/2021    HGB 13.8 10/12/2021    HCT 42.1 10/12/2021     10/12/2021    ALT 9 (L) 10/12/2021    AST 12 10/12/2021     (H) 10/12/2021    K 3.6 10/12/2021     10/12/2021    CREATININE 0.6 10/12/2021    BUN 12 10/12/2021    CO2 22 (L) 10/12/2021    TSH 2.400 10/22/2019    INR 0.89 03/13/2019          Mental Status Exam:   Level of consciousness: awake and alert  Appearance:  well-appearing, hospital attire, in chair, good grooming and good hygiene  Behavior/Motor: calm, brightens with interaction  Attitude toward examiner: cooperative, attentive  Speech: normal rate, low volume   Mood:  \"good\" per patient   Affect:  constricted  Thought processes: more linear and coherent today   Thought content:  Denies homicidal ideation  Suicidal Ideation:  denies suicidal ideation  Delusions:  paranoid but less  Perceptual Disturbance:  denies any perceptual disturbance  Cognition: Patient is oriented to person, place, time  Concentration: clinically adequate  Memory: impaired   Insight & Judgement: limited       ASSESSMENT     Bipolar I disorder, most recent episode depressed with mood-congruent psychotic features ([facility] [address]   Rule out schizoaffective disorder    PLAN    Patient's symptoms show some improvement today  Will continue current medication regimen and observe today   Attempt to develop insight, psycho-education and supportive therapy conducted. Probable discharge: Tomorrow   Follow-up: Zenaida Rain outpatient, daily while on inpatient unit     Electronically signed by Melchor Carballo PA-C on 10/19/2021 at 1:23 PM Reviewed patient's current plan of care and vital signs with nursing staff. Psychiatry Attending Attestation     I independently saw and evaluated the patient. I reviewed the Advance Practice Provider's documentation above. Any additional comments or changes to the Advance Practice Provider's documentation are stated below otherwise agree with assessment. Patient reports her mood is significantly better. Could not elicit any significant paranoia today. Reports that she is continue to work on her coping skills when she is too anxious. Able to contract for safety. Family mentioned that she was not fully back to her baseline. Sebastian Rivas called and inquired about their concerns today.   We will continue same medication today and discharge her tomorrow if she continues to improve    Electronically signed by Rickie Hinton MD on 10/19/21 at 5:21 PM EDT

## 2021-10-19 NOTE — FLOWSHEET NOTE
[facility]. [facility] PROGRESS NOTE      Patient: Mamie Herrera  Room #: 4V-50/605-K            YOB: 1970  Age: 44 y.o. Gender: female            Admit Date & Time: 10/12/2021  2:12 PM    Assessment:   responded to request from pt's nurse to provide the [address].  Pt was anxious and shared that she struggled with participating in activities. Pt loves to draw. Pt is a member of [facility] and chart has been updated to reflect this. Interventions:   provided a listening and supportive presence.  provided the Eucharist to pt.  encouraged pt to participate in activities with her Taoist and elsewhere. Outcomes:  Pt expressed gratitude for the encounter. Plan:  1. Provide spiritual care and support. 2.  Encourage pt to believe she is worthy. Electronically signed by Anna Samuel on 10/19/2021 at 7:08 PM.  [facility]  [phone]       10/19/21 1700   Encounter Summary   Services provided to: Patient   Referral/Consult From: Nurse   Support System Family members; Bahai/yunior community   Place of Confucianism   ([facility])   Continue Visiting Yes  (10/19)   Complexity of Encounter Moderate   Length of Encounter 45 minutes   Spiritual Assessment Completed Yes   Spiritual/Mandaen   Type Spiritual support   Assessment Anxious   Intervention Active listening;Explored feelings, thoughts, concerns;Explored coping resources;Prayer;Communion;Sustaining presence/ Ministry of presence   Outcome Connection/belonging;Comfort;Engaged in conversation;Expressed feelings/needs/concerns   Sacraments   Communion Patient received communion

## 2021-10-19 NOTE — PLAN OF CARE
Patient has attended all of the groups today and has also been out of her room to socialize with others so she has met her socialization goal for this shift.

## 2021-10-19 NOTE — GROUP NOTE
Group Therapy Note    Date: 10/19/2021    Group Start Time: 1330  Group End Time: 1400  Group Topic: Psychotherapy    [facility] 4E    ZENAIDA Baig        Group Therapy Note    Attendees: 6         Patient's Goal:  Discuss with peers personal experience with mental health and its impact on self. Notes:  Patient contributed to group conversation in a positive manner. Patient shares the importance of self-determination and reaching out to supports. Status After Intervention:  Improved    Participation Level:  Active Listener and Interactive    Participation Quality: Appropriate, Attentive, Sharing and Supportive      Speech:  normal      Thought Process/Content: Logical      Affective Functioning: Congruent      Mood: euthymic      Level of consciousness:  Alert, Oriented x4 and Attentive      Response to Learning: Able to verbalize current knowledge/experience, Able to verbalize/acknowledge new learning, Able to retain information, Capable of insight, Able to change behavior and Progressing to goal      Endings: None Reported    Modes of Intervention: Support and Socialization      Discipline Responsible: /Counselor      Signature:  ZENAIDA Baig

## 2021-10-19 NOTE — PLAN OF CARE
Problem: Altered Mood, Psychotic Behavior:  Goal: Able to demonstrate trust by eating, participating in treatment and following staff's direction  Description: Able to demonstrate trust by eating, participating in treatment and following staff's direction  Outcome: Ongoing  Note: Patient eating meals, taking medications, and following staffs direction. Problem: Altered Mood, Psychotic Behavior:  Goal: Able to verbalize reality based thinking  Description: Able to verbalize reality based thinking  Outcome: Ongoing  Note: Patient alert and oriented x 4, reports feeling \"a little paranoid\". Problem: Altered Mood, Psychotic Behavior:  Goal: Ability to achieve adequate nutritional intake will improve  Description: Ability to achieve adequate nutritional intake will improve  Outcome: Ongoing  Note: Patient eating 75% of meals today. Problem: Altered Mood, Psychotic Behavior:  Goal: Ability to interact with others will improve  Description: Ability to interact with others will improve  Outcome: Ongoing  Note: Patient interacts well with staff and peers. Problem: Altered Mood, Psychotic Behavior:  Goal: Compliance with prescribed medication regimen will improve  Description: Compliance with prescribed medication regimen will improve  Outcome: Ongoing  Note: Patient taking medications as prescribed. Problem: Altered Mood, Psychotic Behavior:  Goal: Patient specific goal  Description: Patient specific goal  Outcome: Ongoing  Note: Patient reports goal for today is to \"use time wisely\". Patient continues to work towards goal.       Problem: Altered Mood, Psychotic Behavior:  Goal: Verbalizes reality of situation/illness  Outcome: Ongoing  Note: Patient alert to situation and illness. Problem: Discharge Planning:  Goal: Discharged to appropriate level of care  Description: Discharged to appropriate level of care  Outcome: Ongoing  Note: Patient voices no needs before discharge.  Patient plans to be discharged to home with . Discharge planner working with patient to achieve optimal discharge plans, specific to individual needs. Problem: Anxiety:  Goal: Level of anxiety will decrease  Description: Level of anxiety will decrease  Outcome: Ongoing  Note: Patient reports \"not a lot\" of anxiety. Patient refused to take PRN medications. Problem: Pain:  Goal: Pain level will decrease  Description: Pain level will decrease  Outcome: Ongoing  Note: Pain Assessment: 0-10  Pain Level: 10   Patient's Stated Pain Goal: 8 (refuses pain medications )   Is pain goal met at this time? NA   Patient reports generalized pain, refused pain medications this am.  Non-Pharmaceutical Pain Intervention(s): Relaxation techniques       Problem: Falls - Risk of:  Goal: Will remain free from falls  Description: Will remain free from falls  Outcome: Ongoing  Note: No falls were observed or reported so far this shift, gait steady when ambulating and wears non-skid slippers socks. Encourage patient to wear shower shoes while in the shower. Remains on fall precautions. Problem: Mobility - Impaired:  Goal: Mobility will improve  Description: Mobility will improve  Outcome: Ongoing  Note: Patient ambulates without assistive devices. Care plan reviewed with patient. Patient verbalize understanding of the plan of care and contribute to goal setting.

## 2021-10-19 NOTE — GROUP NOTE
Group Therapy Note    Date: 10/19/2021    Group Start Time: 1100  Group End Time: 36  Group Topic: Healthy Living/Wellness    [facility] 4[facility]    Pravin Max LPN      Notes:  attended    Status After Intervention:  Improved    Participation Level:  Active Listener    Participation Quality: Appropriate and Attentive      Speech:  normal      Thought Process/Content: Logical      Affective Functioning: Flat        Level of consciousness:  Alert, Oriented x4 and Attentive      Response to Learning: Able to verbalize/acknowledge new learning and Able to retain information      Endings: None Reported    Modes of Intervention: Education and Socialization      Discipline Responsible: Licensed Practical Nurse      Signature:  Pravin Max LPN

## 2021-10-19 NOTE — PLAN OF CARE
Problem: Altered Mood, Psychotic Behavior:  Goal: Able to demonstrate trust by eating, participating in treatment and following staff's direction  Description: Able to demonstrate trust by eating, participating in treatment and following staff's direction  10/18/2021 2146 by Cornell Patel LPN  Outcome: Ongoing  Note: Pt able to demonstrate trust by eating, participates in treatment and is able to follow the direction of staff. 10/18/2021 1801 by Elliott Sterling RN  Outcome: Ongoing  Note: Patient eating meals, taking medications and following staff's direction. Goal: Able to verbalize reality based thinking  Description: Able to verbalize reality based thinking  10/18/2021 2146 by Cornell Patel LPN  Outcome: Ongoing  Note: Pt not oriented to situation at this time, pt reports feelings of paranoia but does not appear to be responding to internal stimuli at this time  10/18/2021 1801 by Elliott Sterling RN  Outcome: Ongoing  Note: Patient alert and oriented x 3, reports paranoia is \"a lot better\". Goal: Ability to achieve adequate nutritional intake will improve  Description: Ability to achieve adequate nutritional intake will improve  10/18/2021 2146 by Cornell Patel LPN  Outcome: Met This Shift  Note: Pt has been eating and drinking well. Pt ate 100% of her snack this shift   10/18/2021 1801 by Elliott Sterling RN  Outcome: Ongoing  Note: Patient eating 100% of meals today. Encourage patient to drink supplements for increase calorie intake. Goal: Ability to interact with others will improve  Description: Ability to interact with others will improve  10/18/2021 2146 by Cornell Patel LPN  Outcome: Met This Shift  Note: Pt has fair interaction with peers, pt has been out on the unit and going to groups. 10/18/2021 1801 by Elliott Sterling RN  Outcome: Ongoing  Note: Patient has fair interaction with peers.     Goal: Compliance with prescribed medication regimen will improve  Description: Compliance with prescribed medication regimen will improve  10/18/2021 2146 by Trina Barrientos LPN  Outcome: Ongoing  Note: Pt did not have any medication this shift and has declined PRN medication   10/18/2021 1801 by Coreen Varela RN  Outcome: Ongoing  Note: Patient taking prescribed medications as ordered, declines PRN medications. Goal: Patient specific goal  Description: Patient specific goal  10/18/2021 2146 by Trina Barrientos LPN  Outcome: Ongoing  Note: Pt reports her goal is to \"go home, stay positive and work on coping skills. \"  10/18/2021 1801 by Coreen Varela RN  Outcome: Ongoing  Note: Patient reports goal for today is to [facility] on getting released, stay positive and work on strategy for discharge\". Patient continues to work towards goal.    Goal: Verbalizes reality of situation/illness  10/18/2021 1801 by Coreen Varela RN  Outcome: Ongoing  Note: Patient alert and oriented x 3, not to situation. Problem: Discharge Planning:  Goal: Discharged to appropriate level of care  Description: Discharged to appropriate level of care  10/18/2021 2146 by Trina Barrientos LPN  Outcome: Ongoing  Note: Discharge planner working with pt to achieve optimal plan of discharge specific to the need of the pt, pt plans to be discharged back home with her  and voices no needs prior to discharge. Pt will follow up with [facility]  10/18/2021 1801 by Coreen Varela RN  Outcome: Ongoing  Note: Patient voices no needs before discharge. Patient plans to be discharged to home with . Discharge planner working with patient to achieve optimal discharge plans, specific to individual needs. Problem: Anxiety:  Goal: Level of anxiety will decrease  Description: Level of anxiety will decrease  10/18/2021 2146 by Trina Barrientos LPN  Outcome: Ongoing  Note: Pt denies anxiety at this time. 10/18/2021 1801 by Coreen Varela RN  Outcome: Ongoing  Note: Patient denies feeling anxious so far this shift.      Problem: Pain:  Goal: Pain level will decrease  Description: Pain level will decrease  10/18/2021 2146 by Catalina Singh LPN  Outcome: Ongoing  Note: Pt reports pain related to MS in which she rates 9/10 with 10 being the worst pain. Pt decline pain medication. 10/18/2021 1801 by Frederic Yoon RN  Outcome: Ongoing  Note: Pain Assessment: 0-10  Pain Level: 10   Patient's Stated Pain Goal: 8 (Pt. stated that she always has pain and can deal with it)   Is pain goal met at this time? NA   Patient reports generalized pain, refused to take PRN pain medications. Non-Pharmaceutical Pain Intervention(s): Relaxation techniques, Rest       Problem: Falls - Risk of:  Goal: Will remain free from falls  Description: Will remain free from falls  10/18/2021 2146 by Catalina Singh LPN  Outcome: Ongoing  Note: Pt has remained free of falls so far this shift. Pt walks with a steady gait, wears non-skid slipper socks when ambulating. Pt is encouraged to wear shower shoes during showering times. 10/18/2021 1801 by Frederic Yoon RN  Outcome: Ongoing  Note: No falls were observed or reported so far this shift, gait steady when ambulating and wears non-skid slippers socks. Encourage patient to wear shower shoes while in the shower. Remains on fall precautions. Problem: Mobility - Impaired:  Goal: Mobility will improve  Description: Mobility will improve  10/18/2021 2146 by Catalina Singh LPN  Outcome: Ongoing  Note:   Patient ambulates on unit with assistive devices  10/18/2021 1801 by Frederic Yoon RN  Outcome: Ongoing  Note: Patient ambulates on unit with assistive devices.      Problem: Coping:  Goal: Ability to identify problematic behaviors that deter socialization will improve  Description: Ability to identify problematic behaviors that deter socialization will improve  10/18/2021 2146 by Catalina Singh LPN  Outcome: Met This Shift  Note: Pt has been out on the unit socializing with peers appropriately and has been attending groups. 10/18/2021 1405 by Cami Headings  Outcome: Met This Shift     Care plan reviewed with patient and does verbalize understanding of the plan of care and contribute to goal setting.

## 2021-10-19 NOTE — BH NOTE
This RN has reviewed and agrees with Karen Castro LPN's data collection and has collaborated with this LPN regarding the patient's care plan.

## 2021-10-19 NOTE — BH NOTE
Group Therapy Note    Date: 10/19/2021     Start Time:  1000  End Time:   1050    Number of Participants:    6    Type of Group: Recreational/Coping Skills    Patient's Goal:  Increase socialization, concentration and knowledge of positive coping skills. Notes:    Patient actively participated in coping skills pictionary and was able to identify multiple positive coping skills during group.      Status After Intervention:  Improved    Participation Level: Interactive    Participation Quality: Appropriate, Attentive and Sharing      Speech:  normal      Thought Process/Content: Logical      Affective Functioning: Congruent      Mood: euthymic      Level of consciousness:  Oriented x4      Response to Learning: Progressing to goal      Endings: None Reported    Modes of Intervention: Education, Support, Socialization, Exploration and Activity      Discipline Responsible: Psychoeducational Specialist      Signature:  Marquita Cuevas

## 2021-10-19 NOTE — PROGRESS NOTES
One-to-one maintained  Mental status assessment Patient is sleeping. Patient response to interventions Coopertive  Criteria for discontinuation of 1:1 Patient is free from disruptive behaviors. Rational for continuance of 1:1 precautions Unpredictable and impulsive.

## 2021-10-19 NOTE — BH NOTE
PLAN OF CARE:     Start Time:  0900  End Time:   0930    Group Topic:  Daily Goals    Group Type:   Goal Group    Intervention/Goal:  To increase support and identify daily goals    Attendance:  Attended group    Affect:  Flat    Behavior:  Cooperative and pleasant    Response:   appropriate    Daily Goal:   Use time wisely.     Progress:  Progressing to goal

## 2021-10-19 NOTE — BH NOTE
Group Therapy Note    Date: 10/18/2021  Start Time: 2000  End Time:  2020    Type of Group: Wrap-Up    Patient's Goal:  Go home, stay post bran and work on coping skills     Notes:  Ongoing       Participation Level:  Active Listener and Interactive    Participation Quality: Appropriate      Speech:  normal      Thought Process/Content: Logical      Affective Functioning: Flat      Mood: Pt rates mood 10/10 with 10 being the best. Pt has a flat affect which brightens with interaction, good peer to peer and identifies hope for the future     Level of consciousness:  Alert and Oriented x4      Response to Learning: Able to verbalize current knowledge/experience, Able to verbalize/acknowledge new learning and Progressing to goal        Discipline Responsible: Licensed Practical Nurse      Signature:  Rekha Veliz LPN

## 2021-10-20 VITALS
SYSTOLIC BLOOD PRESSURE: 120 MMHG | TEMPERATURE: 97.4 F | DIASTOLIC BLOOD PRESSURE: 81 MMHG | OXYGEN SATURATION: 98 % | WEIGHT: 125 LBS | HEART RATE: 104 BPM | HEIGHT: 64 IN | RESPIRATION RATE: 16 BRPM | BODY MASS INDEX: 21.34 KG/M2

## 2021-10-20 PROCEDURE — 6370000000 HC RX 637 (ALT 250 FOR IP): Performed by: PHYSICIAN ASSISTANT

## 2021-10-20 PROCEDURE — 99239 HOSP IP/OBS DSCHRG MGMT >30: CPT | Performed by: PSYCHIATRY & NEUROLOGY

## 2021-10-20 PROCEDURE — 5130000000 HC BRIDGE APPOINTMENT

## 2021-10-20 RX ORDER — ARIPIPRAZOLE 15 MG/1
15 TABLET ORAL DAILY
Qty: 30 TABLET | Refills: 0 | Status: SHIPPED | OUTPATIENT
Start: 2021-10-20 | End: 2021-12-20 | Stop reason: ALTCHOICE

## 2021-10-20 RX ADMIN — GLATIRAMER 40 MG: 40 INJECTION, SOLUTION SUBCUTANEOUS at 08:01

## 2021-10-20 RX ADMIN — ARIPIPRAZOLE 15 MG: 15 TABLET ORAL at 08:02

## 2021-10-20 ASSESSMENT — PAIN DESCRIPTION - DESCRIPTORS: DESCRIPTORS: ACHING;DISCOMFORT;CONSTANT

## 2021-10-20 ASSESSMENT — PAIN DESCRIPTION - LOCATION: LOCATION: GENERALIZED

## 2021-10-20 ASSESSMENT — PAIN DESCRIPTION - ONSET: ONSET: ON-GOING

## 2021-10-20 ASSESSMENT — PAIN DESCRIPTION - ORIENTATION: ORIENTATION: OTHER (COMMENT)

## 2021-10-20 ASSESSMENT — PAIN DESCRIPTION - FREQUENCY: FREQUENCY: CONTINUOUS

## 2021-10-20 ASSESSMENT — PAIN DESCRIPTION - PAIN TYPE: TYPE: CHRONIC PAIN

## 2021-10-20 ASSESSMENT — PAIN DESCRIPTION - PROGRESSION: CLINICAL_PROGRESSION: NOT CHANGED

## 2021-10-20 ASSESSMENT — PAIN SCALES - GENERAL: PAINLEVEL_OUTOF10: 10

## 2021-10-20 NOTE — BH NOTE
Group Therapy Note    Date: 10/19/2021  Start Time: 2000  End Time:  2020  Number of Participants: 1    Type of Group: Wrap-Up/Relaxation    Wellness Binder Information  Module Name:  None  Session Number:  None    Patient's Goal:  To use time wisely    Notes:  Ongoing    Status After Intervention:  Improved    Participation Level: Interactive    Participation Quality: Appropriate      Speech:  hesitant      Thought Process/Content: Logical      Affective Functioning: Blunted      Mood: anxious      Level of consciousness:  Alert      Response to Learning: Able to retain information      Endings: None Reported    Modes of Intervention: Education      Discipline Responsible: Registered Nurse      Signature:   Erika Bui RN

## 2021-10-20 NOTE — DISCHARGE SUMMARY
Provider Discharge Summary     Patient ID:  Sophia Beaver  [id number]  33 y.o.  1970    Admit date: 10/12/2021    Discharge date and time: 10/20/2021  7:31 PM     Admitting Physician: Cintia Parada MD     Discharge Physician: Cintia Parada MD    Admission Diagnoses: Bipolar depression ([facility] [F31.9]  Bipolar 1 disorder ([facility] [F31.9]    Discharge Diagnoses:      Bipolar I disorder, most recent episode depressed with mood-congruent psychotic features [facility])     Patient Active Problem List   Diagnosis Code    Bipolar 1 disorder, mixed, severe ([address] F31.63    Schizophrenia ([facility] F20.9    Bipolar I disorder, most recent episode depressed with mood-congruent psychotic features ([facility] F31.5        Admission Condition: poor    Discharged Condition: stable    Indication for Admission: threat to self    History of Present Illnes (present tense wording is of findings from admission exam and are not necessarily indicative of current findings):   Sophia Beaver is a 44 y.o. female with a history of bipolar I disorder and multiple sclerosis who presented to the emergency department voluntarily due to psychosis.      In the ED, patient reported her  brought her to the ED because she is \"out of control\". She reported he is trying to have their son \"provoke her\". Reported her  is a \"\" and she is \"not a doormat\". Reported he \"always camacho his car behind hers\". Reported he \"does not let me be me\". Patient then was talking about how her \"insurance company is bullying her\". Patient reported she has been having increased paranoia since her son was in a car wreck a month ago. Patient stated he is fine but the stress had brought on the paranoia, she does believe that her  and son are trying to set her up.     Patient reports she is admitted because \"Im just paranoid. \" She says she always feels that people are out to get her.  When asked if she felt that way about any one specifically, she says \"people will prey on anybody that will let them be a doormat. \" She is referring to people walking all over her. She then mentions that people take advantage of her. When asked to elaborate further on this, she cannot. She just says she feels that society is against her. She denies any persecutory delusions and does not believe people are trying to physically harm her. When asked if she feels her  and son are against her she says they provoke her when she is vulnerable and try to agitate her. She says her  brought her to the ED because \"he said Im angry and violent. \" However, she denies any anger or aggression prior to admission. When asked about recent stressors, she says \"an incident. \" Again, she is not able to elaborate further on this. In the ED, she reported that her son recently was in a car accident which led to increased stress and paranoid. During the interview, she says she is not sure if that increased her paranoia or not. Patient reports she has not been sleeping too good at home due to pain associated with her MS. She initially says she gets 3-4 hours a night but then says some days she does not fall asleep at all. She acknowledges she has a history of bipolar disorder. However, when asked if she has had any recent manic episodes she does not understand what a manic episode is. After explaining to the patient what a manic episode is and the symptoms associated with sari, she says she denies any recent sari. She does endorse some depression recently and rates her depression 5 out of 10 with 10 being the best. She denies any suicidal ideation prior to admission. She reports her appetite has been normal. Energy has been normal throughout the day. Motivation has been okay. She endorses difficulty with attention and concentration at times. She denies feeling worthless, hopeless and helpless. She denies any recent hallucinations.   Patient reports she has not seen an outpatient psychiatric provider or taken psychotropic medications since 2019. When she was discharged from  she was instructed to follow up with Scotty Chaudhary and was prescribed Seroquel. She stopped the Seroquel because she did not feel it was working. She then says she didn't feel that she needed medications.      Brigida Madrigal is very suspicious and paranoid during the interview. Her affect is flat and she appears depressed. She is a poor historian and is vague and guarded when answering assessment questions. At times, she appears confused and required clarification regarding an assessment question multiple times. She is also slow to respond at times and appears to exhibit some thought blocking throughout the interview. She mentions she does not feel safe on the unit and rates her not feeling safe 5 out of 10. She tells this writer that she feels the providers are going to do whatever they want without considering what is best for her. When asked what she feels is best for her, she says she is not sure. This writer assured her that she is in a safe place and the nursing staff and providers are here to care for her. It was explained that starting a medication to alleviate her paranoia is what this writer feels is best for her. She is agreeable to this. She denies any hallucinations. Denies any homicidal or suicidal ideation. Patient reported to staff this morning she was having 10 out of 10 generalized pain.      Per Viky Kaur RN last night, patient was in bed making loud noises that sound like animal noises. When asked what she was doing patient rambled about how people sleep in different ways. Patient was then moved to a private room where she would not be disruptive to a roommate. Hospital Course:   Upon admission, Juan Merritt was provided a safe secure environment, introduced to unit milieu. Patient participated in groups and individual therapies. Meds were adjusted as noted below.  After few days of hospital care, patient began to feel improvement. Depression lifted, thoughts to harm self ceased. Sleep improved, appetite was good. On morning rounds 10/20/2021, Frank Hobbs endorses feeling ready for discharge. Patient denies suicidal or homicidal ideations, denies hallucinations or delusions. Denies SE's from meds. It was decided that maximum benefit from hospital care had been achieved and patient can be discharged. Consults:   none    Significant Diagnostic Studies: Routine labs and diagnostics    Treatments: Psychotropic medications, therapy with group, milieu, and 1:1 with nurses, social workers, PA-C/CNP, and Attending physician.       Discharge Medications:  Discharge Medication List as of 10/20/2021 10:17 AM      START taking these medications    Details   ARIPiprazole (ABILIFY) 15 MG tablet Take 1 tablet by mouth daily, Disp-30 tablet, R-0Normal         CONTINUE these medications which have NOT CHANGED    Details   glatiramer (COPAXONE) 20 MG/ML injection Inject 40 mg into the skin three times a week Monday, Wednesday, FridayHistorical Med         STOP taking these medications       QUEtiapine (SEROQUEL XR) 50 MG extended release tablet Comments:   Reason for Stopping:                Core Measures statement:   Not applicable    Discharge Exam:  Level of consciousness:  Within normal limits  Appearance: Street clothes, seated, with good grooming  Behavior/Motor: No abnormalities noted  Attitude toward examiner:  Cooperative, attentive, good eye contact  Speech:  spontaneous, normal rate, normal volume and well articulated  Mood:  euthymic  Affect:  Full range  Thought processes:  linear, goal directed and coherent  Thought content:  denies homicidal ideation  Suicidal Ideation:  denies suicidal ideation  Delusions:  no evidence of delusions  Perceptual Disturbance:  denies any perceptual disturbance  Cognition:  Intact  Memory: age appropriate  Insight & Judgement: fair  Medication side effects: denies     Disposition: home    Patient Instructions: Activity: activity as tolerated  1. Patient instructed to take medications regularly and follow up with outpatient appointments. Follow-up as scheduled with JOSE E       Signed:    Electronically signed by Micaela Coleman MD on 10/20/21 at 7:31 PM EDT    Time Spent on discharge is more than 35 minutes in the examination, evaluation, counseling and review of medications and discharge plan.

## 2021-10-20 NOTE — PLAN OF CARE
Problem: Altered Mood, Psychotic Behavior:  Goal: Able to demonstrate trust by eating, participating in treatment and following staff's direction  Description: Able to demonstrate trust by eating, participating in treatment and following staff's direction  10/19/2021 2206 by Nai Pollard RN  Outcome: Ongoing  Note: Patient took 100% of an evening snack, attended group and was cooperative with staff's requests. 10/19/2021 1302 by Ila Lynne RN  Outcome: Ongoing  Note: Patient eating meals, taking medications, and following staffs direction. Goal: Able to verbalize reality based thinking  Description: Able to verbalize reality based thinking  10/19/2021 2206 by Nai Pollard RN  Outcome: Ongoing  Note: Patient is alert and oriented times 4.  10/19/2021 1302 by Ila Lynne RN  Outcome: Ongoing  Note: Patient alert and oriented x 4, reports feeling \"a little paranoid\". Goal: Ability to achieve adequate nutritional intake will improve  Description: Ability to achieve adequate nutritional intake will improve  10/19/2021 2206 by Nai Pollard RN  Outcome: Ongoing  Note: Patient noted taking food and fluids well. 10/19/2021 1302 by Ila Lynne RN  Outcome: Ongoing  Note: Patient eating 75% of meals today. Goal: Ability to interact with others will improve  Description: Ability to interact with others will improve  10/19/2021 2206 by Nai Pollard RN  Outcome: Ongoing  Note: Patient noted with good staff and minimal peer interaction this shift. 10/19/2021 1302 by Ila Lynne RN  Outcome: Ongoing  Note: Patient interacts well with staff and peers. Goal: Compliance with prescribed medication regimen will improve  Description: Compliance with prescribed medication regimen will improve  10/19/2021 2206 by Nai Pollard RN  Outcome: Ongoing  Note: No medications were ordered for bedtime.   10/19/2021 1302 by Ila Lynne RN  Outcome: Ongoing  Note: Patient taking 10   Patient's Stated Pain Goal: 8 (refuses pain medications )   Is pain goal met at this time? NA   Patient reports generalized pain, refused pain medications this am.  Non-Pharmaceutical Pain Intervention(s): Relaxation techniques       Problem: Falls - Risk of:  Goal: Will remain free from falls  Description: Will remain free from falls  10/19/2021 2206 by Michela Vasquez RN  Outcome: Ongoing  Note: No falls noted so far this shift. Gait steady with ambulation. 10/19/2021 1302 by Anna Nair RN  Outcome: Ongoing  Note: No falls were observed or reported so far this shift, gait steady when ambulating and wears non-skid slippers socks. Encourage patient to wear shower shoes while in the shower. Remains on fall precautions. Problem: Mobility - Impaired:  Goal: Mobility will improve  Description: Mobility will improve  10/19/2021 2206 by Michela Vasquez RN  Outcome: Ongoing  Note: Ongoing. 10/19/2021 1302 by Anna Nair RN  Outcome: Ongoing  Note: Patient ambulates without assistive devices. Problem: Coping:  Goal: Ability to identify problematic behaviors that deter socialization will improve  Description: Ability to identify problematic behaviors that deter socialization will improve  10/19/2021 2206 by Michela Vasquez RN  Outcome: Ongoing  Note: Ongoing. 10/19/2021 1405 by Claudia Guadalupe  Outcome: Met This Shift   Care plan reviewed with patient.   Patient does verbalize understanding of the plan of care and does contribute to goal setting

## 2021-10-20 NOTE — PROGRESS NOTES
Behavioral Health   Discharge Note    Pt discharged with followings belongings:   Dentures: None  Vision - Corrective Lenses: Glasses  Hearing Aid: None  Jewelry: None  Body Piercings Removed: No  Clothing: Footwear, Pants, Shirt, Undergarments (Comment), Pajamas  Were All Patient Medications Collected?: Yes  Other Valuables: None   Valuables sent home with patient. Valuables retrieved from safe, Security envelope number:  na and returned to patient. Patient left department with  via private car. Discharged to home. \"An Important Message from [employer] About Your Rights\" form photocopy original from admission and provided to pt at discharge N. Patient education on aftercare instructions: yes  Bridge Appointment completed: Reviewed Discharge Instructions with patient. Patient verbalizes understanding and agreement with the discharge plan using the teachback method. Information faxed to [facility] by . Patient verbalize understanding of AVS:  yes. Status EXAM upon discharge:  Status and Exam  Normal: Yes  Facial Expression: Brightened, Worried  Affect: Appropriate  Level of Consciousness: Alert  Mood:Normal: Yes  Mood: Other (Comment) (rates \"great\")  Motor Activity:Normal: Yes  Motor Activity: Decreased  Interview Behavior: Cooperative  Preception: [place] to Person, [place] to Time, [place] to Situation, [place] to Place  Attention:Normal: Yes  Attention: Distractible  Thought Processes: Circumstantial  Thought Content:Normal: Yes  Thought Content: Paranoia  Hallucinations: None  Delusions: No  Delusions:  Other(See Comment) (paranoid)  Memory:Normal: No  Memory: Poor Recent  Insight and Judgment: No  Insight and Judgment: Poor Insight  Present Suicidal Ideation: No  Present Homicidal Ideation: No    Alcira Zambrano RN

## 2021-10-21 ENCOUNTER — TELEPHONE (OUTPATIENT)
Dept: PSYCHIATRY | Age: 51
End: 2021-10-21

## 2021-10-21 NOTE — PROGRESS NOTES
CLINICAL PHARMACY NOTE: MEDS TO BEDS    Total # of Prescriptions Filled: 1   The following medications were delivered to the patient:  Aripiprazole 15mg    Additional Documentation:

## 2021-12-04 ENCOUNTER — HOSPITAL ENCOUNTER (EMERGENCY)
Age: 51
Discharge: HOME | End: 2021-12-04
Attending: EMERGENCY MEDICINE
Payer: COMMERCIAL

## 2021-12-04 VITALS
DIASTOLIC BLOOD PRESSURE: 58 MMHG | WEIGHT: 125 LBS | BODY MASS INDEX: 21.46 KG/M2 | OXYGEN SATURATION: 93 % | HEART RATE: 119 BPM | TEMPERATURE: 100.5 F | RESPIRATION RATE: 16 BRPM | SYSTOLIC BLOOD PRESSURE: 128 MMHG

## 2021-12-04 DIAGNOSIS — H66.015 RECURRENT ACUTE SUPPURATIVE OTITIS MEDIA WITH SPONTANEOUS RUPTURE OF LEFT TYMPANIC MEMBRANE: Primary | ICD-10-CM

## 2021-12-04 DIAGNOSIS — R50.9 ACUTE FEBRILE ILLNESS: ICD-10-CM

## 2021-12-04 PROCEDURE — 99203 OFFICE O/P NEW LOW 30 MIN: CPT | Performed by: EMERGENCY MEDICINE

## 2021-12-04 PROCEDURE — 99213 OFFICE O/P EST LOW 20 MIN: CPT

## 2021-12-04 RX ORDER — AMOXICILLIN 500 MG/1
500 CAPSULE ORAL 3 TIMES DAILY
Qty: 30 CAPSULE | Refills: 0 | Status: SHIPPED | OUTPATIENT
Start: 2021-12-04 | End: 2021-12-14

## 2021-12-04 ASSESSMENT — ENCOUNTER SYMPTOMS
SHORTNESS OF BREATH: 0
FACIAL SWELLING: 0
EYE REDNESS: 0
NAUSEA: 0
ABDOMINAL PAIN: 0
BLOOD IN STOOL: 0
VOMITING: 0
SORE THROAT: 0
WHEEZING: 0
SINUS PRESSURE: 0
COUGH: 0
EYE DISCHARGE: 0
STRIDOR: 0
CHOKING: 0
TROUBLE SWALLOWING: 0
CONSTIPATION: 0
VOICE CHANGE: 0
DIARRHEA: 0
EYE PAIN: 0
BACK PAIN: 0

## 2021-12-04 ASSESSMENT — PAIN DESCRIPTION - ORIENTATION: ORIENTATION: LEFT

## 2021-12-04 ASSESSMENT — PAIN SCALES - GENERAL: PAINLEVEL_OUTOF10: 3

## 2021-12-04 ASSESSMENT — PAIN DESCRIPTION - DESCRIPTORS: DESCRIPTORS: ACHING

## 2021-12-04 ASSESSMENT — PAIN DESCRIPTION - LOCATION: LOCATION: EAR

## 2021-12-04 ASSESSMENT — PAIN DESCRIPTION - PAIN TYPE: TYPE: ACUTE PAIN

## 2021-12-04 NOTE — ED PROVIDER NOTES
[id number]       Chief Complaint   Patient presents with    Otalgia     Left ear pain. Started last night. Nurses Notes reviewed and I agree except as noted in the HPI. HISTORY OF PRESENT ILLNESS   Yessica Galarza is a 46 y.o. female who presents with a 16-hour history of left ear pain that was more severe earlier and now she rates pain at 3 out of 10 in severity. States pain is similar to previous left otitis media. She has decreased hearing, swollen glands, and some purulent discharge. No bleeding, dizziness, fever, vomiting, headache, chest pain, shortness of breath. No trauma to the ear. History of MS, no diabetes. Non-smoker    REVIEW OF SYSTEMS     Review of Systems   Constitutional: Negative for appetite change, chills, fatigue, fever and unexpected weight change. HENT: Positive for congestion, ear discharge and ear pain. Negative for facial swelling, hearing loss, nosebleeds, postnasal drip, sinus pressure, sore throat, trouble swallowing and voice change. Decreased hearing left ear   Eyes: Negative for pain, discharge, redness and visual disturbance. Respiratory: Negative for cough, choking, shortness of breath, wheezing and stridor. Occasional cough, no shortness of breath   Cardiovascular: Negative for chest pain and leg swelling. No chest pain or syncope   Gastrointestinal: Negative for abdominal pain, blood in stool, constipation, diarrhea, nausea and vomiting. No Abdominal pain or vomiting   Genitourinary: Negative for dysuria, flank pain, frequency, hematuria, urgency, vaginal bleeding and vaginal discharge. Musculoskeletal: Negative for arthralgias, back pain, neck pain and neck stiffness. Skin: Negative for rash. Neurological: Negative for dizziness, seizures, syncope, weakness, light-headedness and headaches.         No headache or dizziness   Hematological: Positive for adenopathy. Does not bruise/bleed easily. Psychiatric/Behavioral: Negative for confusion, sleep disturbance and suicidal ideas. The patient is not nervous/anxious. red and bold elements reviewed    PAST MEDICAL HISTORY         Diagnosis Date    Bipolar 1 disorder, mixed, partial remission ([facility] 2/21/2012    Depression     Multiple sclerosis ([facility]        SURGICAL HISTORY     Patient  has no past surgical history on file. CURRENT MEDICATIONS       Discharge Medication List as of 12/4/2021  3:06 PM      CONTINUE these medications which have NOT CHANGED    Details   ARIPiprazole (ABILIFY) 15 MG tablet Take 1 tablet by mouth daily, Disp-30 tablet, R-0Normal      glatiramer (COPAXONE) 20 MG/ML injection Inject 40 mg into the skin three times a week Monday, Wednesday, FridayHistorical Med             ALLERGIES     Patient is is allergic to zyprexa [olanzapine]. FAMILY HISTORY     Patient'sfamily history includes Heart Disease in her father; Mental Illness in her mother. SOCIAL HISTORY     Patient  reports that she has never smoked. She has never used smokeless tobacco. She reports that she does not drink alcohol and does not use drugs. PHYSICAL EXAM     ED TRIAGE VITALS  BP: (!) 128/58, Temp: 100.5 °F (38.1 °C), Pulse: 119, Resp: 16, SpO2: 93 %  Physical Exam  Vitals and nursing note reviewed. Constitutional:       General: She is not in acute distress. Appearance: She is well-developed. She is not ill-appearing. Comments: Moist membranes, normal airway   HENT:      Head: Normocephalic and atraumatic. Right Ear: Tympanic membrane and external ear normal.      Left Ear: External ear normal. Drainage present. Ears:      Comments: Left TM obscured by purulent discharge     Nose: Nose normal. No congestion or rhinorrhea. Right Sinus: No maxillary sinus tenderness or frontal sinus tenderness. Left Sinus: No maxillary sinus tenderness or frontal sinus tenderness. Mouth/Throat:      Pharynx: No oropharyngeal exudate. Comments: Oropharynx normal  Eyes:      General: No scleral icterus. Right eye: No discharge. Left eye: No discharge. Extraocular Movements:      Right eye: Normal extraocular motion. Left eye: Normal extraocular motion. Conjunctiva/sclera: Conjunctivae normal.      Pupils: Pupils are equal, round, and reactive to light. Comments: Conjunctiva clear   Neck:      Thyroid: No thyromegaly. Vascular: No JVD. Comments: No meningismus  Cardiovascular:      Rate and Rhythm: Normal rate and regular rhythm. Pulses: Normal pulses. Heart sounds: Normal heart sounds, S1 normal and S2 normal. No murmur heard. No friction rub. No gallop. Comments: No murmur  Pulmonary:      Effort: Pulmonary effort is normal. No tachypnea or respiratory distress. Breath sounds: Normal breath sounds. No stridor. No decreased breath sounds, wheezing, rhonchi or rales. Comments: No cough, lungs clear  Chest:      Chest wall: No tenderness. Abdominal:      General: Bowel sounds are normal. There is no distension. Palpations: Abdomen is soft. There is no mass. Tenderness: There is no abdominal tenderness. There is no guarding or rebound. Musculoskeletal:         General: No tenderness. Normal range of motion. Cervical back: Normal range of motion. Comments: Extremities normal   Lymphadenopathy:      Cervical: Cervical adenopathy present. Right cervical: Superficial cervical adenopathy present. No deep cervical adenopathy. Left cervical: Superficial cervical adenopathy and deep cervical adenopathy present. Skin:     General: Skin is warm and dry. Findings: No erythema or rash. Comments: No rash or bruising   Neurological:      Mental Status: She is alert and oriented to person, place, and time. Cranial Nerves: No cranial nerve deficit. Motor: No abnormal muscle tone. Coordination: Coordination normal.      Deep Tendon Reflexes: Reflexes are normal and symmetric. Reflexes normal.      Comments: Appropriate, no focal finding   Psychiatric:         Behavior: Behavior normal.         Thought Content: Thought content normal.         Judgment: Judgment normal.         DIAGNOSTIC RESULTS   Labs: No results found for this visit on 12/04/21. IMAGING:  No orders to display     URGENT CARE COURSE:     Vitals:    12/04/21 1438   BP: (!) 128/58   Pulse: 119   Resp: 16   Temp: 100.5 °F (38.1 °C)   TempSrc: Temporal   SpO2: 93%   Weight: 125 lb (56.7 kg)       Medications - No data to display  PROCEDURES:  None  FINALIMPRESSION      1. Recurrent acute suppurative otitis media with spontaneous rupture of left tympanic membrane    2. Acute febrile illness        DISPOSITION/PLAN   DISPOSITION Decision To Discharge 12/04/2021 03:03:52 PM  Nontoxic, well-hydrated, normal airway. No airway abscess or epiglottitis, sepsis, CNS infection, pneumonia, hypoxia, bronchospasm. Patient has acute left otitis media with spontaneous rupture of left tympanic membrane. Will treat with Amoxil, Tylenol, increased oral clear liquids. Patient is to keep the left ear completely dry and to absorb secretions with cottonball gently placed in the left ear canal without pressure. Patient to follow-up with PCP in 3 days for reevaluation, and she understands to go to ED if worse. Patient given PCP referral per her request.                                                                              PATIENT REFERRED TO:  [address] [place] [address],[address]  [facility] [address] [address] [address]  Schedule an appointment as soon as possible for a visit in 3 days  Recheck in office, go to emergency if worse    DISCHARGE MEDICATIONS:  Discharge Medication List as of 12/4/2021  3:06 PM      START taking these medications    Details   amoxicillin (AMOXIL) 500 MG capsule Take 1 capsule by mouth 3 times daily for 10 days, Disp-30 capsule, R-0Print           Discharge Medication List as of 12/4/2021  3:06 PM          MD Yael Uriarte MD  12/04/21 1593

## 2021-12-20 ENCOUNTER — OFFICE VISIT (OUTPATIENT)
Dept: ENT CLINIC | Age: 51
End: 2021-12-20
Payer: COMMERCIAL

## 2021-12-20 VITALS
OXYGEN SATURATION: 98 % | SYSTOLIC BLOOD PRESSURE: 114 MMHG | WEIGHT: 135 LBS | BODY MASS INDEX: 23.05 KG/M2 | TEMPERATURE: 97.1 F | RESPIRATION RATE: 14 BRPM | HEIGHT: 64 IN | DIASTOLIC BLOOD PRESSURE: 80 MMHG | HEART RATE: 100 BPM

## 2021-12-20 DIAGNOSIS — H60.392 OTHER INFECTIVE ACUTE OTITIS EXTERNA OF LEFT EAR: Primary | ICD-10-CM

## 2021-12-20 PROCEDURE — G8420 CALC BMI NORM PARAMETERS: HCPCS | Performed by: NURSE PRACTITIONER

## 2021-12-20 PROCEDURE — 3017F COLORECTAL CA SCREEN DOC REV: CPT | Performed by: NURSE PRACTITIONER

## 2021-12-20 PROCEDURE — G8484 FLU IMMUNIZE NO ADMIN: HCPCS | Performed by: NURSE PRACTITIONER

## 2021-12-20 PROCEDURE — 99213 OFFICE O/P EST LOW 20 MIN: CPT | Performed by: NURSE PRACTITIONER

## 2021-12-20 PROCEDURE — 4130F TOPICAL PREP RX AOE: CPT | Performed by: NURSE PRACTITIONER

## 2021-12-20 PROCEDURE — G8427 DOCREV CUR MEDS BY ELIG CLIN: HCPCS | Performed by: NURSE PRACTITIONER

## 2021-12-20 PROCEDURE — 1036F TOBACCO NON-USER: CPT | Performed by: NURSE PRACTITIONER

## 2021-12-20 RX ORDER — FLUTICASONE PROPIONATE 50 MCG
SPRAY, SUSPENSION (ML) NASAL
COMMUNITY
End: 2022-01-06 | Stop reason: ALTCHOICE

## 2021-12-20 RX ORDER — OFLOXACIN 3 MG/ML
SOLUTION/ DROPS OPHTHALMIC
Qty: 5 ML | Refills: 0 | Status: SHIPPED | OUTPATIENT
Start: 2021-12-20

## 2021-12-20 RX ORDER — OFLOXACIN 3 MG/ML
SOLUTION/ DROPS OPHTHALMIC
Qty: 5 ML | Refills: 0 | Status: SHIPPED | OUTPATIENT
Start: 2021-12-20 | End: 2021-12-20

## 2021-12-20 NOTE — PROGRESS NOTES
[facility] [place] [facility]  [facility] EAR, NOSE AND THROAT  [address] [facility]  Dept: [phone]  Dept Fax: [phone]  Loc: [phone]    Kristin Pineda is a 46 y.o. female who was referred by No ref. provider found for:  Chief Complaint   Patient presents with    Otitis Media     New patient is here for left ear infection with swollen glands seen at urgent care. c/o right ear plugged      HPI:     Kristin Pineda is a 46 y.o. female new patient here for evaluation of left ear infections. Patient was seen in Urgent Care 12/4/21 for left ear pain; purulent discharge present; treated with Amoxil for left suppurative otitis media with rupture. Still with drainage and decreased hearing. She reports recurrent ear infections for years. No history of PETs. History: Allergies   Allergen Reactions    Zyprexa [Olanzapine] Other (See Comments)      Severe confusion     Current Outpatient Medications   Medication Sig Dispense Refill    ofloxacin (OCUFLOX) 0.3 % solution 5 drops left ear twice daily for 7 days, then once daily until follow up appointment 5 mL 0    glatiramer (COPAXONE) 20 MG/ML injection Inject 40 mg into the skin three times a week Monday, Wednesday, Friday      ciprofloxacin (CILOXAN) 0.3 % ophthalmic solution 5 drops left ear twice daily for 7 days, then once daily until follow up appointment 5 mL 0     No current facility-administered medications for this visit. Past Medical History:   Diagnosis Date    Bipolar 1 disorder, mixed, partial remission ([facility] [address] 2/21/2012    Depression     Multiple sclerosis (HCC)       No past surgical history on file.   Family History   Problem Relation Age of Onset    Mental Illness Mother     Heart Disease Father      Social History     Tobacco Use    Smoking status: Never Smoker    Smokeless tobacco: Never Used   Substance Use Topics    Alcohol use: No        Subjective:      Review of Systems  Rest of review of systems are negative, except as noted in HPI. Objective:     /80 (Site: Left Upper Arm, Position: Sitting)   Pulse 100   Temp 97.1 °F (36.2 °C) (Infrared)   Resp 14   Ht 5' 4\" (1.626 m)   Wt 135 lb (61.2 kg)   SpO2 98%   BMI 23.17 kg/m²     PHYSICAL EXAM  Constitutional: Oriented to person, place, and time. Appears stated aged. Appears well-developed and well-nourished. Voice and speech pattern appropriate for age and gender. No distress. No stridor or audible wheezing. HENT:   Head: Normocephalic and atraumatic. Right Ear:  External ear normal. Tympanic membrane intact. Middle ear aerated. Left Ear:  External ear normal. EAC with mild erythema and small amount of purulent drainage with debris was suctioned. Tympanic membrane intact. Middle ear aerated. Nose:  External nose normal. Nasal mucosa normal. No lesions noted. Mouth/Throat:  Fair dentition. Oral cavity mucosa normal, no masses or lesions noted. Eyes:  Pupils are equal, round, and reactive to light. Conjunctivae and EOM are normal.   Neck:  Normal range of motion. Neck supple. No JVD present. No tracheal deviation present. No thyromegaly present. No cervical lymphadenopathy noted. Cardiovascular:  Normal rate. Pulmonary/Chest:  Effort normal. No stridor or stertor. No respiratory distress. Musculoskeletal:  Normal range of motion. No edema or lymphadenopathy. Neurological:  Alert and oriented to person, place, and time. Cranial nerve II-XII grossly intact. Skin:  Skin is warm. No erythema. Psychiatric:  Normal mood and affect. Behavior is normal.     Vitals reviewed. Data:  All of the past medical history, past surgical history, family history,social history, allergies and current medications were reviewed with the patient. Assessment & Plan   Diagnoses and all orders for this visit:     Diagnosis Orders   1.  Other infective acute otitis externa of left ear         The findings were

## 2021-12-21 ENCOUNTER — TELEPHONE (OUTPATIENT)
Dept: ENT CLINIC | Age: 51
End: 2021-12-21

## 2021-12-21 RX ORDER — CIPROFLOXACIN HYDROCHLORIDE 3.5 MG/ML
SOLUTION/ DROPS TOPICAL
Qty: 5 ML | Refills: 0 | Status: SHIPPED | OUTPATIENT
Start: 2021-12-21

## 2021-12-21 NOTE — TELEPHONE ENCOUNTER
Patient called in stating that there was some confusion at [facility] aid when she went to  her RX. The drops are $16 and she thought that they would be cheaper. I called [facility] and that's what the price is with her co payment. They can use a discount card and it will be $13. She is unhappy with the price.     Please advise

## 2021-12-21 NOTE — TELEPHONE ENCOUNTER
I sent Rx for Ciloxan and will have pharmacist run those. Those are the two most inexpensive and generically available drops we can use. If the price is the same or higher, the ofloxacin would be the cheapest we can get with her insurance. I explained to her yesterday that every insurance is different and it depends what the copay is and what is subject to her deductible.

## 2022-01-06 ENCOUNTER — OFFICE VISIT (OUTPATIENT)
Dept: ENT CLINIC | Age: 52
End: 2022-01-06
Payer: COMMERCIAL

## 2022-01-06 VITALS
DIASTOLIC BLOOD PRESSURE: 72 MMHG | HEART RATE: 94 BPM | SYSTOLIC BLOOD PRESSURE: 106 MMHG | RESPIRATION RATE: 14 BRPM | BODY MASS INDEX: 23.17 KG/M2 | OXYGEN SATURATION: 97 % | TEMPERATURE: 97.1 F | WEIGHT: 135 LBS

## 2022-01-06 DIAGNOSIS — H69.83 DYSFUNCTION OF BOTH EUSTACHIAN TUBES: Primary | ICD-10-CM

## 2022-01-06 PROCEDURE — G8427 DOCREV CUR MEDS BY ELIG CLIN: HCPCS | Performed by: OTOLARYNGOLOGY

## 2022-01-06 PROCEDURE — G8484 FLU IMMUNIZE NO ADMIN: HCPCS | Performed by: OTOLARYNGOLOGY

## 2022-01-06 PROCEDURE — 1036F TOBACCO NON-USER: CPT | Performed by: OTOLARYNGOLOGY

## 2022-01-06 PROCEDURE — G8420 CALC BMI NORM PARAMETERS: HCPCS | Performed by: OTOLARYNGOLOGY

## 2022-01-06 PROCEDURE — 99213 OFFICE O/P EST LOW 20 MIN: CPT | Performed by: OTOLARYNGOLOGY

## 2022-01-06 PROCEDURE — 3017F COLORECTAL CA SCREEN DOC REV: CPT | Performed by: OTOLARYNGOLOGY

## 2022-01-06 NOTE — PROGRESS NOTES
[place]  [facility]  Dept: [phone]  Dept Fax: [phone]: [phone]       Dear No ref. provider found, thank you for referring Gianfranco Boggs in consultation for a chief complaint of: Ear pressure. My full evaluation is as follows:     HPI:     As you recall, Gianfranco Boggs is a 46 y.o. female who presents today for evaluation of her ears. Frequently she will have some ear fullness, some mild pain, and decreased hearing. She has been told these are ear infections and usually gets treated with antibiotics. The antibiotics really seem to help. She was seen about a month ago in our office with something in her ear canal.  She thinks it was a couple months that we removed although our notes do not corroborate that. At any rate she was started on eardrops. She is here today for routine follow-up. Bethany Miranda History: Allergies   Allergen Reactions    Zyprexa [Olanzapine] Other (See Comments)      Severe confusion     Current Outpatient Medications   Medication Sig Dispense Refill    ciprofloxacin (CILOXAN) 0.3 % ophthalmic solution 5 drops left ear twice daily for 7 days, then once daily until follow up appointment 5 mL 0    ofloxacin (OCUFLOX) 0.3 % solution 5 drops left ear twice daily for 7 days, then once daily until follow up appointment 5 mL 0    glatiramer (COPAXONE) 20 MG/ML injection Inject 40 mg into the skin three times a week Monday, Wednesday, Friday       No current facility-administered medications for this visit. Past Medical History:   Diagnosis Date    Bipolar 1 disorder, mixed, partial remission ([facility] [address] 2/21/2012    Depression     Multiple sclerosis ([facility] [address]       History reviewed. No pertinent surgical history.   Family History   Problem Relation Age of Onset    Mental Illness Mother     Heart Disease Father      Social History     Tobacco Use    Smoking status: Never Smoker    Smokeless tobacco: Never Used   Substance Use Topics    Alcohol use: No       All of the past medical history, past surgical history, family history,social history, allergies and current medications were reviewed with the patient. Review of Systems      A complete review of systems was obtained by the patient intake form, which is attached to this visit. Objective:   /72 (Site: Right Upper Arm, Position: Sitting)   Pulse 94   Temp 97.1 °F (36.2 °C)   Resp 14   Wt 135 lb (61.2 kg)   SpO2 97%   BMI 23.17 kg/m²     PHYSICAL EXAM  ABNORMAL OTOLARYNGOLOGIC EXAM FINDINGS: None     OTHER PERTINENT EXAM FINDINGS:  GENERAL: Awake, NAD, Non-toxic appearing. Normal voice quality  NEUROLOGICAL: GCS 15, Cranial nerves II-VI, VIII-XII grossly intact,  Facial nerve (VII) w/ House-Brackman Grade 1 of 6 bilaterally, No evidence of nystagmus or gross asymmetry    EARS: Pinna well-formed,  EAC non-stenotic w/o cerumen impaction AU,  TM's intact AU w/o effusion or active infection appreciated  EYES: EOMI, No ptosis appreciated   NOSE: Dorsum w/o scar or deformity,  No mucopurulence appreciated, Patent nasal airways bilaterally. No inferior turbinate hypertrophy. No septal deviation. ORAL CAVITY: No mucosal masses/lesions appreciated, Tongue with FROM. Appropriate SCOOBY w/o trismus. OROPHARYNX: No mucosal masses or lesions appreciated. Symmetric palatal elevation w/o draping. NECK: Soft, supple. No crepitus or masses appreciated,  Trachea midline. No thyromegaly or thyroid tenderness. Data: The relevant prior clinic notes were reviewed today, including the referring physicians notes. Imaging: None       Assessment & Plan   Evelin Man is a 46 y.o. female with:   1. Dysfunction of both eustachian tubes       She has intermittent eustachian tube dysfunction causing negative pressure, which would explain her symptoms.   I do not think she is getting otitis media, nor do I think she needs antibiotics to resolve her effusions. I would like to see her back next time she has an effusion, to clarify what is actually going on. At any rate since her symptoms are intermittent I do not think tubes are indicated, and she does not want tubes for this problem. I tried to educate her on the difference between ear canal infections and middle ear infections, and which infections to use eardrops versus oral antibiotics. No follow-ups on file. Thank you for involving me in this patient's care. Please do not hesitate to call with any questions or concerns. Sincerely,    Ricky Posadas M.D. Otolaryngology-Head and Neck Surgery    **This report has been created using voice recognition software. It may contain minor errors which are inherent in voice recognition technology. **

## 2024-09-11 ENCOUNTER — HOSPITAL ENCOUNTER (INPATIENT)
Age: 54
LOS: 8 days | Discharge: HOME | DRG: 885 | End: 2024-09-19
Attending: PSYCHIATRY & NEUROLOGY | Admitting: PSYCHIATRY & NEUROLOGY
Payer: COMMERCIAL

## 2024-09-11 DIAGNOSIS — F31.5 BIPOLAR I DISORDER, MOST RECENT EPISODE DEPRESSED WITH MOOD-CONGRUENT PSYCHOTIC FEATURES (HCC): ICD-10-CM

## 2024-09-11 DIAGNOSIS — F23 ACUTE PSYCHOSIS (HCC): Primary | ICD-10-CM

## 2024-09-11 LAB
ALBUMIN SERPL BCG-MCNC: 4.6 G/DL (ref 3.5–5.1)
ALP SERPL-CCNC: 29 U/L (ref 38–126)
ALT SERPL W/O P-5'-P-CCNC: 9 U/L (ref 11–66)
AMPHETAMINES UR QL SCN: NEGATIVE
ANION GAP SERPL CALC-SCNC: 18 MEQ/L (ref 8–16)
APAP SERPL-MCNC: < 5 UG/ML (ref 0–20)
AST SERPL-CCNC: 13 U/L (ref 5–40)
BACTERIA URNS QL MICRO: ABNORMAL /HPF
BARBITURATES UR QL SCN: NEGATIVE
BASOPHILS ABSOLUTE: 0 THOU/MM3 (ref 0–0.1)
BASOPHILS NFR BLD AUTO: 0.3 %
BENZODIAZ UR QL SCN: NEGATIVE
BILIRUB CONJ SERPL-MCNC: < 0.1 MG/DL (ref 0.1–13.8)
BILIRUB SERPL-MCNC: 0.4 MG/DL (ref 0.3–1.2)
BILIRUB UR QL STRIP.AUTO: NEGATIVE
BUN SERPL-MCNC: 18 MG/DL (ref 7–22)
BZE UR QL SCN: NEGATIVE
CALCIUM SERPL-MCNC: 10.2 MG/DL (ref 8.5–10.5)
CANNABINOIDS UR QL SCN: NEGATIVE
CASTS #/AREA URNS LPF: ABNORMAL /LPF
CASTS 2: ABNORMAL /LPF
CHARACTER UR: ABNORMAL
CHLORIDE SERPL-SCNC: 102 MEQ/L (ref 98–111)
CO2 SERPL-SCNC: 22 MEQ/L (ref 23–33)
COLOR, UA: YELLOW
CREAT SERPL-MCNC: 0.7 MG/DL (ref 0.4–1.2)
CRYSTALS URNS MICRO: ABNORMAL
DEPRECATED RDW RBC AUTO: 40.9 FL (ref 35–45)
EOSINOPHIL NFR BLD AUTO: 0.2 %
EOSINOPHILS ABSOLUTE: 0 THOU/MM3 (ref 0–0.4)
EPITHELIAL CELLS, UA: ABNORMAL /HPF
ERYTHROCYTE [DISTWIDTH] IN BLOOD BY AUTOMATED COUNT: 12.8 % (ref 11.5–14.5)
ETHANOL SERPL-MCNC: < 0.01 % (ref 0–0.08)
FENTANYL: NEGATIVE
GFR SERPL CREATININE-BSD FRML MDRD: > 90 ML/MIN/1.73M2
GLUCOSE SERPL-MCNC: 121 MG/DL (ref 70–108)
GLUCOSE UR QL STRIP.AUTO: NEGATIVE MG/DL
HCT VFR BLD AUTO: 40.6 % (ref 37–47)
HGB BLD-MCNC: 13.9 GM/DL (ref 12–16)
HGB UR QL STRIP.AUTO: NEGATIVE
IMM GRANULOCYTES # BLD AUTO: 0.04 THOU/MM3 (ref 0–0.07)
IMM GRANULOCYTES NFR BLD AUTO: 0.3 %
KETONES UR QL STRIP.AUTO: 80
LYMPHOCYTES ABSOLUTE: 1.6 THOU/MM3 (ref 1–4.8)
LYMPHOCYTES NFR BLD AUTO: 13.7 %
MCH RBC QN AUTO: 30.3 PG (ref 26–33)
MCHC RBC AUTO-ENTMCNC: 34.2 GM/DL (ref 32.2–35.5)
MCV RBC AUTO: 88.6 FL (ref 81–99)
MISCELLANEOUS 2: ABNORMAL
MONOCYTES ABSOLUTE: 1.1 THOU/MM3 (ref 0.4–1.3)
MONOCYTES NFR BLD AUTO: 9.2 %
MUCOUS THREADS URNS QL MICRO: ABNORMAL
NEUTROPHILS ABSOLUTE: 9.1 THOU/MM3 (ref 1.8–7.7)
NEUTROPHILS NFR BLD AUTO: 76.3 %
NITRITE UR QL STRIP: NEGATIVE
NRBC BLD AUTO-RTO: 0 /100 WBC
OPIATES UR QL SCN: NEGATIVE
OSMOLALITY SERPL CALC.SUM OF ELEC: 286.3 MOSMOL/KG (ref 275–300)
OXYCODONE: NEGATIVE
PCP UR QL SCN: NEGATIVE
PH UR STRIP.AUTO: 6.5 [PH] (ref 5–9)
PLATELET # BLD AUTO: 370 THOU/MM3 (ref 130–400)
PMV BLD AUTO: 10.1 FL (ref 9.4–12.4)
POTASSIUM SERPL-SCNC: 3 MEQ/L (ref 3.5–5.2)
PROT SERPL-MCNC: 7.5 G/DL (ref 6.1–8)
PROT UR STRIP.AUTO-MCNC: 100 MG/DL
RBC # BLD AUTO: 4.58 MILL/MM3 (ref 4.2–5.4)
RBC URINE: ABNORMAL /HPF
RENAL EPI CELLS #/AREA URNS HPF: ABNORMAL /[HPF]
SALICYLATES SERPL-MCNC: < 0.3 MG/DL (ref 2–10)
SODIUM SERPL-SCNC: 142 MEQ/L (ref 135–145)
SP GR UR REFRACT.AUTO: 1.03 (ref 1–1.03)
TSH SERPL DL<=0.005 MIU/L-ACNC: 3.07 UIU/ML (ref 0.4–4.2)
UROBILINOGEN, URINE: 1 EU/DL (ref 0–1)
WBC # BLD AUTO: 11.9 THOU/MM3 (ref 4.8–10.8)
WBC #/AREA URNS HPF: ABNORMAL /HPF
WBC #/AREA URNS HPF: ABNORMAL /[HPF]
YEAST LIKE FUNGI URNS QL MICRO: ABNORMAL

## 2024-09-11 PROCEDURE — 87077 CULTURE AEROBIC IDENTIFY: CPT

## 2024-09-11 PROCEDURE — 80307 DRUG TEST PRSMV CHEM ANLYZR: CPT

## 2024-09-11 PROCEDURE — 80143 DRUG ASSAY ACETAMINOPHEN: CPT

## 2024-09-11 PROCEDURE — 87086 URINE CULTURE/COLONY COUNT: CPT

## 2024-09-11 PROCEDURE — 82248 BILIRUBIN DIRECT: CPT

## 2024-09-11 PROCEDURE — 6370000000 HC RX 637 (ALT 250 FOR IP): Performed by: PSYCHIATRY & NEUROLOGY

## 2024-09-11 PROCEDURE — 85025 COMPLETE CBC W/AUTO DIFF WBC: CPT

## 2024-09-11 PROCEDURE — 82077 ASSAY SPEC XCP UR&BREATH IA: CPT

## 2024-09-11 PROCEDURE — 80053 COMPREHEN METABOLIC PANEL: CPT

## 2024-09-11 PROCEDURE — 84443 ASSAY THYROID STIM HORMONE: CPT

## 2024-09-11 PROCEDURE — 36415 COLL VENOUS BLD VENIPUNCTURE: CPT

## 2024-09-11 PROCEDURE — 81001 URINALYSIS AUTO W/SCOPE: CPT

## 2024-09-11 PROCEDURE — 80179 DRUG ASSAY SALICYLATE: CPT

## 2024-09-11 PROCEDURE — 99285 EMERGENCY DEPT VISIT HI MDM: CPT

## 2024-09-11 PROCEDURE — 1240000000 HC EMOTIONAL WELLNESS R&B

## 2024-09-11 RX ORDER — LORAZEPAM 2 MG/ML
2 INJECTION INTRAMUSCULAR EVERY 4 HOURS PRN
Status: DISCONTINUED | OUTPATIENT
Start: 2024-09-11 | End: 2024-09-19 | Stop reason: HOSPADM

## 2024-09-11 RX ORDER — HALOPERIDOL 5 MG/ML
5 INJECTION INTRAMUSCULAR EVERY 4 HOURS PRN
Status: DISCONTINUED | OUTPATIENT
Start: 2024-09-11 | End: 2024-09-19 | Stop reason: HOSPADM

## 2024-09-11 RX ORDER — IBUPROFEN 400 MG/1
400 TABLET, FILM COATED ORAL EVERY 6 HOURS PRN
Status: DISCONTINUED | OUTPATIENT
Start: 2024-09-11 | End: 2024-09-19 | Stop reason: HOSPADM

## 2024-09-11 RX ORDER — MAGNESIUM HYDROXIDE/ALUMINUM HYDROXICE/SIMETHICONE 120; 1200; 1200 MG/30ML; MG/30ML; MG/30ML
30 SUSPENSION ORAL EVERY 6 HOURS PRN
Status: DISCONTINUED | OUTPATIENT
Start: 2024-09-11 | End: 2024-09-19 | Stop reason: HOSPADM

## 2024-09-11 RX ORDER — LORAZEPAM 2 MG/1
2 TABLET ORAL EVERY 4 HOURS PRN
Status: DISCONTINUED | OUTPATIENT
Start: 2024-09-11 | End: 2024-09-19 | Stop reason: HOSPADM

## 2024-09-11 RX ORDER — TRAZODONE HYDROCHLORIDE 50 MG/1
50 TABLET, FILM COATED ORAL NIGHTLY PRN
Status: DISCONTINUED | OUTPATIENT
Start: 2024-09-11 | End: 2024-09-19 | Stop reason: HOSPADM

## 2024-09-11 RX ORDER — ACETAMINOPHEN 325 MG/1
650 TABLET ORAL EVERY 4 HOURS PRN
Status: DISCONTINUED | OUTPATIENT
Start: 2024-09-11 | End: 2024-09-19 | Stop reason: HOSPADM

## 2024-09-11 RX ORDER — HALOPERIDOL 5 MG/1
5 TABLET ORAL EVERY 4 HOURS PRN
Status: DISCONTINUED | OUTPATIENT
Start: 2024-09-11 | End: 2024-09-19 | Stop reason: HOSPADM

## 2024-09-11 RX ADMIN — HALOPERIDOL 5 MG: 5 TABLET ORAL at 22:09

## 2024-09-11 ASSESSMENT — SLEEP AND FATIGUE QUESTIONNAIRES
SLEEP PATTERN: DIFFICULTY FALLING ASLEEP;RESTLESSNESS
DO YOU USE A SLEEP AID: NO
DO YOU HAVE DIFFICULTY SLEEPING: YES

## 2024-09-11 ASSESSMENT — PAIN SCALES - GENERAL: PAINLEVEL_OUTOF10: 10

## 2024-09-11 ASSESSMENT — PAIN - FUNCTIONAL ASSESSMENT: PAIN_FUNCTIONAL_ASSESSMENT: 0-10

## 2024-09-12 PROBLEM — F23 ACUTE PSYCHOSIS (HCC): Status: RESOLVED | Noted: 2024-09-11 | Resolved: 2024-09-12

## 2024-09-12 LAB
BACTERIA UR CULT: ABNORMAL
ORGANISM: ABNORMAL

## 2024-09-12 PROCEDURE — 1240000000 HC EMOTIONAL WELLNESS R&B

## 2024-09-12 PROCEDURE — APPSS30 APP SPLIT SHARED TIME 16-30 MINUTES: Performed by: PHYSICIAN ASSISTANT

## 2024-09-12 PROCEDURE — 6360000002 HC RX W HCPCS: Performed by: PSYCHIATRY & NEUROLOGY

## 2024-09-12 PROCEDURE — 99222 1ST HOSP IP/OBS MODERATE 55: CPT | Performed by: PSYCHIATRY & NEUROLOGY

## 2024-09-12 PROCEDURE — 6370000000 HC RX 637 (ALT 250 FOR IP): Performed by: PSYCHIATRY & NEUROLOGY

## 2024-09-12 RX ORDER — RISPERIDONE 0.25 MG/1
0.5 TABLET ORAL NIGHTLY
Status: DISCONTINUED | OUTPATIENT
Start: 2024-09-12 | End: 2024-09-19 | Stop reason: HOSPADM

## 2024-09-12 RX ORDER — HYDROXYZINE HYDROCHLORIDE 25 MG/1
50 TABLET, FILM COATED ORAL 3 TIMES DAILY PRN
Status: DISCONTINUED | OUTPATIENT
Start: 2024-09-12 | End: 2024-09-19 | Stop reason: HOSPADM

## 2024-09-12 RX ADMIN — LORAZEPAM 2 MG: 2 INJECTION INTRAMUSCULAR; INTRAVENOUS at 20:58

## 2024-09-12 RX ADMIN — HALOPERIDOL LACTATE 5 MG: 5 INJECTION, SOLUTION INTRAMUSCULAR at 20:58

## 2024-09-12 RX ADMIN — HYDROXYZINE HYDROCHLORIDE 50 MG: 25 TABLET, FILM COATED ORAL at 20:09

## 2024-09-12 RX ADMIN — RISPERIDONE 0.5 MG: 0.25 TABLET, FILM COATED ORAL at 20:09

## 2024-09-12 RX ADMIN — HALOPERIDOL 5 MG: 5 TABLET ORAL at 09:39

## 2024-09-12 ASSESSMENT — PAIN SCALES - GENERAL
PAINLEVEL_OUTOF10: 0
PAINLEVEL_OUTOF10: 10

## 2024-09-12 ASSESSMENT — PAIN DESCRIPTION - LOCATION: LOCATION: OTHER (COMMENT)

## 2024-09-13 PROCEDURE — APPSS30 APP SPLIT SHARED TIME 16-30 MINUTES: Performed by: PHYSICIAN ASSISTANT

## 2024-09-13 PROCEDURE — 6370000000 HC RX 637 (ALT 250 FOR IP): Performed by: PSYCHIATRY & NEUROLOGY

## 2024-09-13 PROCEDURE — 1240000000 HC EMOTIONAL WELLNESS R&B

## 2024-09-13 PROCEDURE — 99232 SBSQ HOSP IP/OBS MODERATE 35: CPT | Performed by: PSYCHIATRY & NEUROLOGY

## 2024-09-13 RX ADMIN — RISPERIDONE 0.5 MG: 0.25 TABLET, FILM COATED ORAL at 20:43

## 2024-09-14 PROBLEM — F31.9 AFFECTIVE PSYCHOSIS, BIPOLAR (HCC): Status: ACTIVE | Noted: 2024-09-14

## 2024-09-14 PROCEDURE — 99232 SBSQ HOSP IP/OBS MODERATE 35: CPT | Performed by: PSYCHIATRY & NEUROLOGY

## 2024-09-14 PROCEDURE — 6370000000 HC RX 637 (ALT 250 FOR IP): Performed by: PSYCHIATRY & NEUROLOGY

## 2024-09-14 PROCEDURE — APPSS30 APP SPLIT SHARED TIME 16-30 MINUTES: Performed by: NURSE PRACTITIONER

## 2024-09-14 PROCEDURE — 1240000000 HC EMOTIONAL WELLNESS R&B

## 2024-09-14 RX ADMIN — RISPERIDONE 0.5 MG: 0.25 TABLET, FILM COATED ORAL at 21:02

## 2024-09-14 ASSESSMENT — PAIN SCALES - GENERAL: PAINLEVEL_OUTOF10: 8

## 2024-09-14 ASSESSMENT — PAIN DESCRIPTION - PAIN TYPE: TYPE: CHRONIC PAIN

## 2024-09-14 ASSESSMENT — PAIN DESCRIPTION - LOCATION: LOCATION: GENERALIZED

## 2024-09-14 ASSESSMENT — PAIN DESCRIPTION - DESCRIPTORS: DESCRIPTORS: ACHING;OTHER (COMMENT)

## 2024-09-15 PROCEDURE — 6370000000 HC RX 637 (ALT 250 FOR IP): Performed by: PSYCHIATRY & NEUROLOGY

## 2024-09-15 PROCEDURE — APPSS30 APP SPLIT SHARED TIME 16-30 MINUTES: Performed by: NURSE PRACTITIONER

## 2024-09-15 PROCEDURE — 90833 PSYTX W PT W E/M 30 MIN: CPT | Performed by: PSYCHIATRY & NEUROLOGY

## 2024-09-15 PROCEDURE — 1240000000 HC EMOTIONAL WELLNESS R&B

## 2024-09-15 PROCEDURE — 99232 SBSQ HOSP IP/OBS MODERATE 35: CPT | Performed by: PSYCHIATRY & NEUROLOGY

## 2024-09-15 RX ADMIN — RISPERIDONE 0.5 MG: 0.25 TABLET, FILM COATED ORAL at 21:20

## 2024-09-16 PROCEDURE — 6370000000 HC RX 637 (ALT 250 FOR IP): Performed by: PSYCHIATRY & NEUROLOGY

## 2024-09-16 PROCEDURE — 90833 PSYTX W PT W E/M 30 MIN: CPT | Performed by: PSYCHIATRY & NEUROLOGY

## 2024-09-16 PROCEDURE — 1240000000 HC EMOTIONAL WELLNESS R&B

## 2024-09-16 PROCEDURE — 99232 SBSQ HOSP IP/OBS MODERATE 35: CPT | Performed by: PSYCHIATRY & NEUROLOGY

## 2024-09-16 PROCEDURE — APPSS30 APP SPLIT SHARED TIME 16-30 MINUTES: Performed by: PHYSICIAN ASSISTANT

## 2024-09-16 RX ADMIN — TRAZODONE HYDROCHLORIDE 50 MG: 50 TABLET ORAL at 20:54

## 2024-09-16 RX ADMIN — RISPERIDONE 0.5 MG: 0.25 TABLET, FILM COATED ORAL at 20:52

## 2024-09-16 RX ADMIN — HYDROXYZINE HYDROCHLORIDE 50 MG: 25 TABLET, FILM COATED ORAL at 20:54

## 2024-09-16 ASSESSMENT — PAIN DESCRIPTION - PAIN TYPE: TYPE: CHRONIC PAIN

## 2024-09-16 ASSESSMENT — PAIN DESCRIPTION - ONSET: ONSET: ON-GOING

## 2024-09-16 ASSESSMENT — PAIN DESCRIPTION - FREQUENCY: FREQUENCY: CONTINUOUS

## 2024-09-16 ASSESSMENT — PAIN SCALES - GENERAL
PAINLEVEL_OUTOF10: 8
PAINLEVEL_OUTOF10: 8

## 2024-09-16 ASSESSMENT — PAIN - FUNCTIONAL ASSESSMENT: PAIN_FUNCTIONAL_ASSESSMENT: ACTIVITIES ARE NOT PREVENTED

## 2024-09-16 ASSESSMENT — PAIN DESCRIPTION - LOCATION
LOCATION: FOOT
LOCATION: LEG;ARM

## 2024-09-17 PROCEDURE — 6370000000 HC RX 637 (ALT 250 FOR IP): Performed by: PSYCHIATRY & NEUROLOGY

## 2024-09-17 PROCEDURE — 99232 SBSQ HOSP IP/OBS MODERATE 35: CPT | Performed by: PSYCHIATRY & NEUROLOGY

## 2024-09-17 PROCEDURE — 1240000000 HC EMOTIONAL WELLNESS R&B

## 2024-09-17 PROCEDURE — APPSS30 APP SPLIT SHARED TIME 16-30 MINUTES: Performed by: PHYSICIAN ASSISTANT

## 2024-09-17 RX ADMIN — RISPERIDONE 0.5 MG: 0.25 TABLET, FILM COATED ORAL at 21:10

## 2024-09-17 ASSESSMENT — PAIN DESCRIPTION - ONSET
ONSET: ON-GOING
ONSET: ON-GOING

## 2024-09-17 ASSESSMENT — PAIN DESCRIPTION - FREQUENCY
FREQUENCY: CONTINUOUS
FREQUENCY: INTERMITTENT

## 2024-09-17 ASSESSMENT — PAIN DESCRIPTION - LOCATION
LOCATION: GENERALIZED
LOCATION: GENERALIZED

## 2024-09-17 ASSESSMENT — PAIN SCALES - GENERAL
PAINLEVEL_OUTOF10: 4
PAINLEVEL_OUTOF10: 7

## 2024-09-17 ASSESSMENT — PAIN DESCRIPTION - DESCRIPTORS
DESCRIPTORS: DISCOMFORT
DESCRIPTORS: DISCOMFORT;DULL;ACHING

## 2024-09-17 ASSESSMENT — PAIN DESCRIPTION - PAIN TYPE
TYPE: CHRONIC PAIN
TYPE: CHRONIC PAIN

## 2024-09-18 PROCEDURE — 1240000000 HC EMOTIONAL WELLNESS R&B

## 2024-09-18 PROCEDURE — 6370000000 HC RX 637 (ALT 250 FOR IP): Performed by: PSYCHIATRY & NEUROLOGY

## 2024-09-18 RX ORDER — GLATIRAMER 40 MG/ML
40 INJECTION, SOLUTION SUBCUTANEOUS
Status: DISCONTINUED | OUTPATIENT
Start: 2024-09-18 | End: 2024-09-19 | Stop reason: HOSPADM

## 2024-09-18 RX ADMIN — RISPERIDONE 0.5 MG: 0.25 TABLET, FILM COATED ORAL at 21:55

## 2024-09-18 ASSESSMENT — PAIN DESCRIPTION - LOCATION
LOCATION: GENERALIZED
LOCATION: GENERALIZED

## 2024-09-18 ASSESSMENT — PAIN DESCRIPTION - FREQUENCY: FREQUENCY: CONTINUOUS

## 2024-09-18 ASSESSMENT — PAIN DESCRIPTION - DESCRIPTORS: DESCRIPTORS: DULL;DISCOMFORT;ACHING

## 2024-09-18 ASSESSMENT — PAIN DESCRIPTION - ONSET: ONSET: ON-GOING

## 2024-09-18 ASSESSMENT — PAIN SCALES - GENERAL: PAINLEVEL_OUTOF10: 4

## 2024-09-18 ASSESSMENT — PAIN DESCRIPTION - PAIN TYPE: TYPE: CHRONIC PAIN

## 2024-09-18 ASSESSMENT — PAIN - FUNCTIONAL ASSESSMENT: PAIN_FUNCTIONAL_ASSESSMENT: ACTIVITIES ARE NOT PREVENTED

## 2024-09-19 VITALS
DIASTOLIC BLOOD PRESSURE: 81 MMHG | RESPIRATION RATE: 16 BRPM | TEMPERATURE: 98.4 F | HEART RATE: 106 BPM | HEIGHT: 63 IN | OXYGEN SATURATION: 98 % | BODY MASS INDEX: 21.26 KG/M2 | SYSTOLIC BLOOD PRESSURE: 127 MMHG | WEIGHT: 120 LBS

## 2024-09-19 PROCEDURE — 5130000000 HC BRIDGE APPOINTMENT

## 2024-09-19 RX ORDER — RISPERIDONE 0.5 MG/1
0.5 TABLET ORAL NIGHTLY
Qty: 30 TABLET | Refills: 0 | Status: SHIPPED | OUTPATIENT
Start: 2024-09-19

## 2024-09-19 RX ORDER — RISPERIDONE 0.5 MG/1
0.5 TABLET ORAL NIGHTLY
Qty: 30 TABLET | Refills: 0 | Status: SHIPPED | OUTPATIENT
Start: 2024-09-19 | End: 2024-09-19

## 2024-09-19 ASSESSMENT — PAIN SCALES - GENERAL: PAINLEVEL_OUTOF10: 8

## 2024-09-19 ASSESSMENT — PAIN DESCRIPTION - LOCATION: LOCATION: GENERALIZED
